# Patient Record
Sex: FEMALE | URBAN - METROPOLITAN AREA
[De-identification: names, ages, dates, MRNs, and addresses within clinical notes are randomized per-mention and may not be internally consistent; named-entity substitution may affect disease eponyms.]

---

## 2022-09-06 ENCOUNTER — HOME CARE VISIT (OUTPATIENT)
Dept: HOSPICE | Facility: HOSPICE | Age: 87
End: 2022-09-06
Payer: MEDICARE

## 2022-09-06 ENCOUNTER — HOSPICE ADMISSION (OUTPATIENT)
Dept: HOSPICE | Facility: HOSPICE | Age: 87
End: 2022-09-06
Payer: MEDICARE

## 2022-09-06 PROCEDURE — 0651 HSPC ROUTINE HOME CARE

## 2022-09-06 PROCEDURE — HOSPICE MEDICATION HC HH HOSPICE MEDICATION

## 2022-09-06 PROCEDURE — 3336500001 HSPC ELECTION

## 2022-09-07 ENCOUNTER — HOME CARE VISIT (OUTPATIENT)
Dept: HOSPICE | Facility: HOSPICE | Age: 87
End: 2022-09-07
Payer: MEDICARE

## 2022-09-07 VITALS
DIASTOLIC BLOOD PRESSURE: 59 MMHG | TEMPERATURE: 97.5 F | SYSTOLIC BLOOD PRESSURE: 109 MMHG | HEART RATE: 88 BPM | RESPIRATION RATE: 20 BRPM | WEIGHT: 126 LBS | OXYGEN SATURATION: 94 %

## 2022-09-07 PROCEDURE — G0299 HHS/HOSPICE OF RN EA 15 MIN: HCPCS

## 2022-09-07 PROCEDURE — HOSPICE MEDICATION HC HH HOSPICE MEDICATION

## 2022-09-07 PROCEDURE — 0651 HSPC ROUTINE HOME CARE

## 2022-09-07 NOTE — HOSPICE
Hospice Admission Summary  Mrs. Nneka Holman old female, admitted to Hospice services for a terminal diagnosis of Intracranial Mass. Patient has elected hospice services and is no longer seeking aggressive treatment. Co-morbidities related to the terminal diagnosis are HTN, Intracranial hemmorage, falls,     Patient also has a past medical history of  Brainstem tumor, Hypertension ,Leukocytosis, Acute kidney injury, Hypomagnesemia . Mrs. Rose status 6 months - 1 year prior to hospice admission A/Ox3,continent of B&B,  living with grandson,independent adl, ambulatory with cane, cooking meals for her and grandson, taking meds by herself, last wt at home per patient 130lbs, denied pain but occassional in last 6 months extremities jerking, dizziness and headache interminently. Patient was taken to hospital after 2nd fall first hitting her head and second falling over her walker and increased confusion, while admitted to hospital she was found to have Intracranial mass on brain no futher treatment available and patient desired to be made comfortable  The patient/family/caregiver grandson; Giana Sommers is  present and willing and safely able to provide care and administer medications, their availability is daily and as needed. The patient/family/caregiver/facility participated in goal setting, care planning, and are agreeable to the care plan. Admission booklet reviewed with patient/family/caregiver/facility; services provided under hospice benefit, review of rights and responsibilities, disposal of medications, contact information for , Joint Commission, Medicare, O, and outside resources for independence. The patient/family/caregiver/facility educated on IDT and their right to attend meetings. Education provided regarding 24-hour availability of hospice services and on-call number provided.     Attending physician:  Dr Elder Middleton Director:  [place] of Care: routine  Advance Directives:  DNR  :  N/A  Allergies:  NKA  BMI 23.5  PPS 40%  MAC:  25   Height:  5'2\"  Weight:  126 4lb weight loss in 1 month  PPS:  40%  FAST:  N/A  NYHA:  N/A   EF%:  N/A   Tobacco usage:  N/A,   Functional status: A/Ox3 with periods of forgetfulness, [place], mod-max adl care, incontinent of B&B, bedfast but would need max x2 assist to transfer to , pureed /mechanical soft, dysphagia, able to assist with turning and reposition in bed, noted dark spots on back, arms and legs, patient stating she doesn't have any appetite or desire to drink her grandson encourages and reminds her to drink and eat, complaint of nausea and vomting with movement, dizziness and dysphagia since her 2nd fall and   10-25% 1-2x daily and 10-12oz daily, pureed to mechanical soft with difficulty swalowing and choking occassionally per patient and able to feed herself   Complaint of nausea and dysphagia with cough using Pepcid and Zofran daily  Infection:  N/A   Pain:  N/A or occassional Headaches medications include Morphine from comfort package, Tylenol  Bowel Regimen:  Senna daily  Lines, Drains, or Airways  N/A  Wounds present:  None  Symptoms to monitor to maintain comfort:  headaches and nausea using Tylenol and Zofran   Hospice Aide Services Requested:  Aide 1x week  MSW Requested:   Requested:  Volunteer Services Requested:  yes  Bereavement risk/contact:  low risk Trev Kan  Patient/family/caregiver specific end of life goal:  comfort care and decrease nausea   Training and education provided this visit: Education provided regarding 24-hour availability of hospice services, comfort medication and on-call number provided. Plan for next visit:  continue education on hospice and followup on nausea   Care coordination with Medical Director, IDG, and team regarding admission to hospice and all are in agreement with plan of care.

## 2022-09-07 NOTE — HOSPICE
Upon arrival to home, patient is sitting quitely watching tv with grandson, [facility], at side. She is VERY hard of hearing and Lai repeats 90% of what I say to her so that she can hear him. She stated, \"women have much softer voices but I can hear him better. \" Patient is pleasant, alert and oreinted x 4. She laughs at me when I ask her her birthday and who [facility] is. She answers all questions appropriately. When questioned about pain, she stated \"my bottom hurst a little bit but if I get in the bed it will stop hurting. \" Patient and [facility] deny any wounds to sacral area at this time. Patient also denies any issues with shortness of breath at this visit. She is very concerned about what medications she will continue to take and states \"everybody keeps changing them so I don't know who to believe. All I know is I have a large brain tumor that's inoperable and isn't going to get better. But one person tells me I'm getting better. Then one person tells me I'm not and I just don't know who to believe any more. \" I advised patient and [facility] that we would be completing a medication reconciliation with Dr Robert Benedict and to continue to take her medications as directed from the facility today until we complete the reconcilitation. Both verbalized understanding. I assisted the patient up 3 stairs to get in a w/c to go to her bedroom. She is able to stand with maximum assistance but is very unsteady when attempting to ambulate with the walker. [facility] is behind her with his fingers in her beltloop and had he not been, she would've fallen multiple times coming up the steps. Her legs appeared to [place] out on her\" and she is shaking and states \"this is what the tumor is doing to me. \" Once in the bedroom, again with assistance, she is able to get from the w/c to her own bed which she is starting to realize is a little high up for her.  I talked to [facility] and patient about getting them a hospital bed and they both verbalized that they \"want to keep this bed for now. \" It is a motorized bed that will reposition her legs and back but does not lower or lift higher. Laure asked about the bedside table and I told him that we would follow up to find out about delivery. The transport w/c was delivered today but not the BST. I did explain to Laure that we have been having some items that are on back order but the order stays active and they will deliver one as soon as possible. He verbalized understanding. Laure asked about a plan for Wednesday. I explained to [facility] that the  would be coming out to complete the patient's assessment/admission and that someone would call him in the morning to schedule a time to come see her. He verbalized understanding of this and had no additional questions/concerns at this time. They were both grateful for the tuck in visit tonight. Patient was resting in bed in no acute distress upon the completion of my visit. Advised Lai to call the 24 hour number if needed and he agreed to do so.

## 2022-09-08 ENCOUNTER — HOME CARE VISIT (OUTPATIENT)
Dept: HOSPICE | Facility: HOSPICE | Age: 87
End: 2022-09-08
Payer: MEDICARE

## 2022-09-08 ENCOUNTER — HOME CARE VISIT (OUTPATIENT)
Dept: SCHEDULING | Facility: HOME HEALTH | Age: 87
End: 2022-09-08
Payer: MEDICARE

## 2022-09-08 PROCEDURE — 0651 HSPC ROUTINE HOME CARE

## 2022-09-08 PROCEDURE — HOSPICE MEDICATION HC HH HOSPICE MEDICATION

## 2022-09-08 PROCEDURE — G0155 HHCP-SVS OF CSW,EA 15 MIN: HCPCS

## 2022-09-09 ENCOUNTER — HOME CARE VISIT (OUTPATIENT)
Dept: HOSPICE | Facility: HOSPICE | Age: 87
End: 2022-09-09
Payer: MEDICARE

## 2022-09-09 ENCOUNTER — HOME CARE VISIT (OUTPATIENT)
Dept: SCHEDULING | Facility: HOME HEALTH | Age: 87
End: 2022-09-09
Payer: MEDICARE

## 2022-09-09 PROCEDURE — 0651 HSPC ROUTINE HOME CARE

## 2022-09-09 PROCEDURE — G0156 HHCP-SVS OF AIDE,EA 15 MIN: HCPCS

## 2022-09-09 PROCEDURE — G0300 HHS/HOSPICE OF LPN EA 15 MIN: HCPCS

## 2022-09-09 NOTE — HOSPICE
The following standard precautions for infection control were utilized:  face mask, hand , and eye protection. The purpose of today's visit was to complete an initial psychosocial assessment for [facility]. [facility] is a 80 y.o. female admitted to hospice on 09/06/22 with a terminal diagnosis of Intracranial mass. SW was greeted at the front door of the home by kesha Banks and primary CG of pt. The home is well cared for and clutter free. There are no visible signs of any safety concerns noted by SW at this time. The home is occupied by the PT and CG. CG states the residence is his grandmother's but he has lived in the home with her for most of his life. At the time of SW visit, PT had just had a light lunch and was asleep. All information during assessment was gained through primary CG. CG states that about a year prior to admission PT had been experiencing dizziness and several falls. CG took PT to physician who related falls and forgetfulness to age. Recently the pt experienced a fall that caused an admission to the ED, when tests showed a tumor at the base of the brain. CG stated that PT was sent to [facility] for OT and PT but was unable to make progress and it was decided that hospice services would be beneficial at this time. CG brought pt home and pt was admitted to hospice services. CG stated the first time he ever had to change the diaper of an adult was last week, but he feels he is comfortable being able to provide the care needed for his grandmother. SW speaks with CG about spending time in the room when LOMELI arrives to receive education on pt positioning, moving pt, incontinence care and other education that can be provided that will be useful to both pt and CG. CG states that PT lived a long full and active life. CG states the \"fall\" changed her.   PT had owned two homes in [address] and up until about 10 years ago spent time between each home.  PT was driving, cooking, and completing all ADL's prior to last ED admission. CG states that PT believed she had  arrangement preplanned but they realized about 1 year ago that it was just for burial.  At that time, pt appointed 10k for CG to use for  services. A  home has not been chosen at this time. SW gives CG several local  homes and encourages to call and find one he feels can meet his needs when pt passes. He agrees to begin the process. CG expressed that he and pt have gone over all financial matters and he is listed on her bank account (upon death), is executor of the will and he has been given instruction on pt wishes for dispersement of her assets. A financial POA was signed and notorized in the hospital, but the bank would not accept the document. SW will provided CG with the correct legal documentation and provide notarization services next week. CG states pt does not require pain medication at this time, but he welcomed education on EOL pain medication and reason for its use. CG stated that a private CG will being in the home in about 2 weeks, so he can return to work. She will be providing care full time Wednesday-, weekly. CG reports that pt still is sharp in her mind and only occasionally confused (mostly with the different cg's that are now coming from hospice). CG received supplies while SW was visiting. He did request X large gloves instead of large. RN ELISA will be notified of request. John Hoff is a low on the bereavement risk assessment. He feels that his grandmother and he have talked openly about EOL issues. There are no other needs at this time. CG is notified of his right to attend IDG meetings. CG is in agreement with POC.

## 2022-09-10 VITALS
RESPIRATION RATE: 18 BRPM | DIASTOLIC BLOOD PRESSURE: 78 MMHG | SYSTOLIC BLOOD PRESSURE: 132 MMHG | HEART RATE: 88 BPM | TEMPERATURE: 97.7 F

## 2022-09-10 PROCEDURE — 0651 HSPC ROUTINE HOME CARE

## 2022-09-10 NOTE — HOSPICE
Patient presents sitting up in bed. No signs of distress or discomfort. Patient states she hasn't had a BM since Sunday at the rehab facility. Abdomen is soft and non tender, no distension noted. Caregiver states there has been no change in appetite. Suppository given this visit. Pain:Patient denies pain    Medication refills: None needed    Medications reconciled and all medications are available in the home this visit. The following education was provided regarding medications, medication interactions, and look a like medications: Medication side effects, dosages, purposes, frequencies. Response to teaching: Verbalized understanding. Medications  are effective at this time. Supplies by type and quantity ordered/delivered this visit include: None needed    Consulted attending physician regarding: Not needed this visit    Instructed patient and family on 24-hour hospice availability and phone number.

## 2022-09-10 NOTE — HOSPICE
The following standard precautions for infection control were utilized: Face Mask, Safety Glasses and Gloves. Care provided per established plan of care:  Yes. Patient is without complaints during care provided. Patient requests: N/A    Family/Caregiver requests:  N/A    Communicated to , Clinical Manager, or Director regarding patient/family/caregiver/aide findings:  N/A    Status of patient upon end of hospice aide visit:  Patient laying in her hospital bed watching TV.

## 2022-09-11 PROCEDURE — 0651 HSPC ROUTINE HOME CARE

## 2022-09-12 ENCOUNTER — HOME CARE VISIT (OUTPATIENT)
Dept: SCHEDULING | Facility: HOME HEALTH | Age: 87
End: 2022-09-12
Payer: MEDICARE

## 2022-09-12 PROCEDURE — 0651 HSPC ROUTINE HOME CARE

## 2022-09-12 PROCEDURE — G0299 HHS/HOSPICE OF RN EA 15 MIN: HCPCS

## 2022-09-12 NOTE — HOSPICE
The following standard precautions for infection control were utilized:  Mask  Patient was in bed and her daughter from [place] was brushing her hair. She was in great spirits and we talk for sometime. Family has agreed to one visit per month for three months.

## 2022-09-13 VITALS
HEART RATE: 98 BPM | TEMPERATURE: 96.5 F | RESPIRATION RATE: 18 BRPM | DIASTOLIC BLOOD PRESSURE: 73 MMHG | OXYGEN SATURATION: 94 % | SYSTOLIC BLOOD PRESSURE: 106 MMHG

## 2022-09-13 PROCEDURE — 0651 HSPC ROUTINE HOME CARE

## 2022-09-13 NOTE — HOSPICE
Patient a/ox3 able to communicate needs and assist with turning sitting on the side of bed upon my arrival stating she has dizziness with movement for brief periods   patient eating 1-2 meals 25% and stating she has no appetite only eats for nutrition still having nausea but using zofran 2x daily and effective per patient  grandson and daughter present during visit, nurse demonstrated to daughter how to change patient brief and she verbalized understanding      Medication refills ordered this visit: Dulcolax suppository     Medications reconciled and all medications are/are not available in the home this visit. The following education was provided regarding medications, medication interactions, and look alike medications. Response to teaching: verbalized understanding of need to give suppository if no BM in 3 days and use of stool softner daily and agreed by patient and grandson. Medications are effective at this time. Supplies by type and quantity ordered this visit include: brief lg and chux    Consulted medical director/attending physician regarding: no need    Instructed patient/family/caregiver on 24-hour hospice availability and phone number.     Plan for next visit:  monitor for n/v and dizziness

## 2022-09-14 PROCEDURE — 0651 HSPC ROUTINE HOME CARE

## 2022-09-15 ENCOUNTER — HOME CARE VISIT (OUTPATIENT)
Dept: SCHEDULING | Facility: HOME HEALTH | Age: 87
End: 2022-09-15
Payer: MEDICARE

## 2022-09-15 ENCOUNTER — HOME CARE VISIT (OUTPATIENT)
Dept: HOSPICE | Facility: HOSPICE | Age: 87
End: 2022-09-15
Payer: MEDICARE

## 2022-09-15 VITALS
TEMPERATURE: 96.5 F | SYSTOLIC BLOOD PRESSURE: 88 MMHG | DIASTOLIC BLOOD PRESSURE: 54 MMHG | HEART RATE: 94 BPM | RESPIRATION RATE: 18 BRPM | OXYGEN SATURATION: 95 %

## 2022-09-15 PROCEDURE — HOSPICE MEDICATION HC HH HOSPICE MEDICATION

## 2022-09-15 PROCEDURE — 0651 HSPC ROUTINE HOME CARE

## 2022-09-15 PROCEDURE — G0156 HHCP-SVS OF AIDE,EA 15 MIN: HCPCS

## 2022-09-15 PROCEDURE — G0155 HHCP-SVS OF CSW,EA 15 MIN: HCPCS

## 2022-09-15 PROCEDURE — G0299 HHS/HOSPICE OF RN EA 15 MIN: HCPCS

## 2022-09-15 NOTE — HOSPICE
patient A/Ox3 denied pain but stated she has headcahe 4/10 in the mornings but goes away after a while  Zofran is being taken 2x daily and pepsid 2x also   noted swollen tongue and white complaint of thickness and difficulty swallowing   NP called to report this change she ordered Nystatin and sent to pharm   patient and grandson made aware and verbalized understanding      Medication refills ordered this visit: zofran     Medications reconciled and all medications are/are not available in the home this visit. The following education was provided regarding medications, medication interactions, and look alike medications. Response to teaching: verbalized understanding of use of Nystatin. Medications are not effective at this time related to not in home at this time. Supplies by type and quantity ordered this visit include: none      Consulted medical director/attending physician regarding: reported thick and white tongue    Instructed patient/family/caregiver on 24-hour hospice availability and phone number.     Plan for next visit:  follow up on tongue and osmany coccyx

## 2022-09-16 ENCOUNTER — HOME CARE VISIT (OUTPATIENT)
Dept: HOSPICE | Facility: HOSPICE | Age: 87
End: 2022-09-16
Payer: MEDICARE

## 2022-09-16 PROCEDURE — 0651 HSPC ROUTINE HOME CARE

## 2022-09-16 NOTE — HOSPICE
The following standard precautions for infection control were utilized: Face Mask, Safety Glasses and Gloves. Care provided per established plan of care:  Yes. Patient is without complaints during care provided. Patient requests: N/A    Family/Caregiver requests: N/A    Communicated to , Clinical Manager, or Director regarding patient/family/caregiver/aide findings: N/A    Status of patient upon end of hospice aide visit:  Patient sitting up in her bed talking to her daughter who is visiting from out of town. Daughter is at patient bedside.

## 2022-09-17 PROCEDURE — 0651 HSPC ROUTINE HOME CARE

## 2022-09-18 PROCEDURE — 0651 HSPC ROUTINE HOME CARE

## 2022-09-19 ENCOUNTER — HOME CARE VISIT (OUTPATIENT)
Dept: SCHEDULING | Facility: HOME HEALTH | Age: 87
End: 2022-09-19
Payer: MEDICARE

## 2022-09-19 ENCOUNTER — HOME CARE VISIT (OUTPATIENT)
Dept: HOSPICE | Facility: HOSPICE | Age: 87
End: 2022-09-19
Payer: MEDICARE

## 2022-09-19 PROCEDURE — G0299 HHS/HOSPICE OF RN EA 15 MIN: HCPCS

## 2022-09-19 PROCEDURE — HOSPICE MEDICATION HC HH HOSPICE MEDICATION

## 2022-09-19 PROCEDURE — 0651 HSPC ROUTINE HOME CARE

## 2022-09-19 NOTE — HOSPICE
The following standard precautions for infection control were utilized:  face mask, hand , eye protection. Today's visit was a PRN visit to help discuss financial needs of patient. The SW was greeted at the door by pt's daughter that is visiting from [place]. CG had stated that there was a need for need POA paperwork to be completed so that he Gabino Bradford, primary CG) could have access to take care of the financial needs of pt. The patient had previously given POA to Elaina Tyrese but the bank would not except the document (unclear why). SW presented family with need POA paperwork for [employer]. SW went over the document and will return when family has time to discuss the documents with pt. Family asked not to sign the paperwork until SW could return and witness signatures and notarize the document. CG understands. SW also offered emotional support and supportive listening to the daughter of pt, who has some unresolved emotional turmoil with the pt. Active listening and feelings were explored. Follow up will be needed for paperwork. A bereavement POC will be added for daughter of patient.

## 2022-09-20 ENCOUNTER — HOME CARE VISIT (OUTPATIENT)
Dept: HOSPICE | Facility: HOSPICE | Age: 87
End: 2022-09-20
Payer: MEDICARE

## 2022-09-20 VITALS
RESPIRATION RATE: 18 BRPM | OXYGEN SATURATION: 90 % | HEART RATE: 95 BPM | SYSTOLIC BLOOD PRESSURE: 119 MMHG | DIASTOLIC BLOOD PRESSURE: 72 MMHG | TEMPERATURE: 96.5 F

## 2022-09-20 PROCEDURE — 0651 HSPC ROUTINE HOME CARE

## 2022-09-21 PROCEDURE — 0651 HSPC ROUTINE HOME CARE

## 2022-09-22 ENCOUNTER — HOME CARE VISIT (OUTPATIENT)
Dept: SCHEDULING | Facility: HOME HEALTH | Age: 87
End: 2022-09-22
Payer: MEDICARE

## 2022-09-22 VITALS
SYSTOLIC BLOOD PRESSURE: 127 MMHG | HEART RATE: 76 BPM | DIASTOLIC BLOOD PRESSURE: 80 MMHG | OXYGEN SATURATION: 93 % | TEMPERATURE: 97.2 F | RESPIRATION RATE: 20 BRPM

## 2022-09-22 PROCEDURE — G0299 HHS/HOSPICE OF RN EA 15 MIN: HCPCS

## 2022-09-22 PROCEDURE — 0651 HSPC ROUTINE HOME CARE

## 2022-09-22 PROCEDURE — G0156 HHCP-SVS OF AIDE,EA 15 MIN: HCPCS

## 2022-09-22 NOTE — HOSPICE
Patient A/Ox3 denied pain stated she had a bad night her head felt full, but not now  hoda present during visit, voiced supplies need and patient had bm 2 days ago   patient stated she feels a little winded today after being repositioned   caregiver has been holding bp med because bp has been low 102/76 for 2 days and today med wasn't taken band the bp is good so we discussed holding bp med unless over 140/100 and both agreed but he will check daily  Medication refills ordered this visit: none    Medications reconciled and all medications are/are not available in the home this visit. The following education was provided regarding medications, medication interactions, and look alike medications. Response to teaching: caregiver verbalized understanding of the monitoring blood pressure med related to dizziness possibly holding because of decreased bp. Medications are effective at this time and only being taken if bp is too above 140/100. Supplies by type and quantity ordered this visit include: chux, med brief     Consulted medical director/attending physician regarding: reported dizziness and holding bp med per caregiver request    Instructed patient/family/caregiver on 24-hour hospice availability and phone number.     Plan for next visit:  monitor bp and dizziness

## 2022-09-23 PROCEDURE — 0651 HSPC ROUTINE HOME CARE

## 2022-09-23 NOTE — HOSPICE
The following standard precautions for infection control were utilized: Face Mask, Safety Glasses and Gloves. Care provided per established plan of care:  Yes. Patient is without complaints during care provided. Patient requests:  N/A    Family/Caregiver requests:  N/A    Communicated to , Clinical Manager, or Director regarding patient/family/caregiver/aide findings: N/A     Status of patient upon end of hospice aide visit:  Patient laying in her bed with her eyes opened and her grandson by her bedside.

## 2022-09-24 PROCEDURE — 0651 HSPC ROUTINE HOME CARE

## 2022-09-25 ENCOUNTER — HOME CARE VISIT (OUTPATIENT)
Dept: HOSPICE | Facility: HOSPICE | Age: 87
End: 2022-09-25
Payer: MEDICARE

## 2022-09-25 PROCEDURE — 0651 HSPC ROUTINE HOME CARE

## 2022-09-26 ENCOUNTER — HOME CARE VISIT (OUTPATIENT)
Dept: SCHEDULING | Facility: HOME HEALTH | Age: 87
End: 2022-09-26
Payer: MEDICARE

## 2022-09-26 VITALS
HEART RATE: 94 BPM | TEMPERATURE: 96.4 F | RESPIRATION RATE: 18 BRPM | SYSTOLIC BLOOD PRESSURE: 116 MMHG | DIASTOLIC BLOOD PRESSURE: 58 MMHG

## 2022-09-26 PROCEDURE — G0156 HHCP-SVS OF AIDE,EA 15 MIN: HCPCS

## 2022-09-26 PROCEDURE — G0299 HHS/HOSPICE OF RN EA 15 MIN: HCPCS

## 2022-09-26 PROCEDURE — 0651 HSPC ROUTINE HOME CARE

## 2022-09-26 NOTE — HOSPICE
paatient a/ox3 and complaint of coccyx intact and osmany but painful when alseep mostly, headache daily and using Ibuprophren used occasionally   appetite 1-2 meals and drinking ensure daily   complaint of occasional a little burning when first start but goes away   decreased in n/v and continues to use Zofran 2x daily and increased headache pain when lying flat in bed  complaint of insomnia and md increased to 2 tabs     Medication refills ordered this visit: none    Medications reconciled and all medications are/are not available in the home this visit. The following education was provided regarding medications, medication interactions, and look alike medications. Response to teaching: verbalized understanding. Medications are effective at this time. Supplies by type and quantity ordered this visit include: none marce    Consulted medical director/attending physician regarding: no need    Instructed patient/family/caregiver on 24-hour hospice availability and phone number.     Plan for next visit:  monitor headaches, coccyx for pain and opening

## 2022-09-27 ENCOUNTER — HOME CARE VISIT (OUTPATIENT)
Dept: HOSPICE | Facility: HOSPICE | Age: 87
End: 2022-09-27
Payer: MEDICARE

## 2022-09-27 PROCEDURE — 0651 HSPC ROUTINE HOME CARE

## 2022-09-27 PROCEDURE — G0155 HHCP-SVS OF CSW,EA 15 MIN: HCPCS

## 2022-09-28 ENCOUNTER — HOME CARE VISIT (OUTPATIENT)
Dept: HOSPICE | Facility: HOSPICE | Age: 87
End: 2022-09-28
Payer: MEDICARE

## 2022-09-28 ENCOUNTER — HOME CARE VISIT (OUTPATIENT)
Dept: SCHEDULING | Facility: HOME HEALTH | Age: 87
End: 2022-09-28
Payer: MEDICARE

## 2022-09-28 PROCEDURE — 0651 HSPC ROUTINE HOME CARE

## 2022-09-28 NOTE — HOSPICE
The following standard precautions for infection control were utilized:  Mask  Patient was watching TV with her caregiver. Today she shared more about her life and her two daughters. Patient also states that she is taking it one day at a time and that this is all in Gods hands.  will follow up with family, patient and .

## 2022-09-28 NOTE — HOSPICE
The following standard precautions for infection control were utilized:  face mask, eye protection, and hand . Today's PRN visit was to assist cg/pt with [facility] services for transfer of the title of the home. SW was welcomed into the home by kesha Jones. Patient was received in her room laying in bed resting. The home well cared for, clutter free and no visible safety concerns noted. PT welcomed SW into the room. SW has to speak loudly due to pt's hearing. PT stated she is feeling a little tired but was ready to get the paperwork signed for her grandson. PT went over reasons with SW about why Nedra Shane was getting the home. SW provided active listening as she talked about her daughter moving to [place] and grandson Nedra Shane returning to pt's home once he turned 25. There is some tension still present when pt speaks about daughter moving away. PT and SW discussed forgiveness and acceptance. SW and CG discussed the paperwork that she was signing and she agreed that Nedra Shane would be titled the home and she understood she is able to change her mind at any time. PT and SW also discussed her  arrangements and possible cost involved. PT stated she has a burial plot but has not paid for a coffin or service yet. PT questioned if the money she had set aside would be enough and SW agreed that what was set aside for  services would be more than enough. Pt has a goal of making it to her 89st birthday just a couple weeks away. Pt has a very sharp mind and can recall very quickly about her finances, health, and family. SW, CG and pt go over both the transfer of deed to the home and pt also decides to sign a durable POA at this time, to give Nedra Shane access to her banking account, should she become unable to make those decisions. No other needs discussed at this time.

## 2022-09-29 ENCOUNTER — HOME CARE VISIT (OUTPATIENT)
Dept: SCHEDULING | Facility: HOME HEALTH | Age: 87
End: 2022-09-29
Payer: MEDICARE

## 2022-09-29 PROCEDURE — G0156 HHCP-SVS OF AIDE,EA 15 MIN: HCPCS

## 2022-09-29 PROCEDURE — 0651 HSPC ROUTINE HOME CARE

## 2022-09-29 PROCEDURE — G0299 HHS/HOSPICE OF RN EA 15 MIN: HCPCS

## 2022-09-29 NOTE — HOSPICE
The following standard precautions for infection control were utilized: Face Mask, Safety Glasses and Gloves. Care provided per established plan of care:  Yes. Patient is without complaints during care provided. Patient requests: N/A    Family/Caregiver requests:  N/A    Communicated to , Clinical Manager, or Director regarding patient/family/caregiver/aide findings:  N/A    Status of patient upon end of hospice aide visit:  Patient laying in her bed watching TV.

## 2022-09-30 VITALS — TEMPERATURE: 97.2 F | SYSTOLIC BLOOD PRESSURE: 118 MMHG | OXYGEN SATURATION: 94 % | DIASTOLIC BLOOD PRESSURE: 78 MMHG

## 2022-09-30 PROCEDURE — 0651 HSPC ROUTINE HOME CARE

## 2022-09-30 NOTE — HOSPICE
patient bed denied pain or dizziness no nausea  in past week  discussion with patient and grandson about lowered bp, decreased dizziness and no need for blood pressure meds  he agreed to continue to stop med   Np updated and dc'd med and new nurse will monitor bp if need for meds related to dizziness after med taken with sudden movement   decreased episodes of dizziness since Norvasc has been held and bp wnl     Medication refills ordered this visit: none    Medications reconciled and all medications are/are not available in the home this visit. The following education was provided regarding medications, medication interactions, and look alike medications. Response to teaching: verbalized understanding to call hospice if bp above 160/100. stopping the bp medication are effective at this time. Supplies by type and quantity ordered this visit include: none    Consulted medical director/attending physician regarding: reported the continued stop of Lisinopril related to low bp and dizziness Np Kurtis Chambers agreed to dc and monitor bp weekly    Instructed patient/family/caregiver on 24-hour hospice availability and phone number.     Plan for next visit:  monitor bp weekly

## 2022-10-01 PROCEDURE — 0651 HSPC ROUTINE HOME CARE

## 2022-10-01 NOTE — HOSPICE
The following standard precautions for infection control were utilized: Face Mask, Safety Glasses and Gloves. Care provided per established plan of care: Yes. Patient is without complaints during care provided. Patient requests: N/A    Family/Caregiver requests:  N/A    Communicated to , Clinical Manager, or Director regarding patient/family/caregiver/aide findings:   was present for part of this visit. Status of patient upon end of hospice aide visit:  Patient sitting up in her hospital bed talking to her paid aide about what she wants for lunch.

## 2022-10-02 PROCEDURE — 0651 HSPC ROUTINE HOME CARE

## 2022-10-03 ENCOUNTER — HOME CARE VISIT (OUTPATIENT)
Dept: SCHEDULING | Facility: HOME HEALTH | Age: 87
End: 2022-10-03
Payer: MEDICARE

## 2022-10-03 VITALS
TEMPERATURE: 97.9 F | DIASTOLIC BLOOD PRESSURE: 84 MMHG | HEART RATE: 98 BPM | RESPIRATION RATE: 18 BRPM | OXYGEN SATURATION: 92 % | SYSTOLIC BLOOD PRESSURE: 127 MMHG

## 2022-10-03 PROCEDURE — G0156 HHCP-SVS OF AIDE,EA 15 MIN: HCPCS

## 2022-10-03 PROCEDURE — G0300 HHS/HOSPICE OF LPN EA 15 MIN: HCPCS

## 2022-10-03 PROCEDURE — 0651 HSPC ROUTINE HOME CARE

## 2022-10-03 PROCEDURE — HOSPICE MEDICATION HC HH HOSPICE MEDICATION

## 2022-10-03 NOTE — HOSPICE
Patient presents lying in bed in supine position. NO signs of discomfort. Denies pain. No new concerns per kesha Sheikh. Medication refills: Trazadone and Zofran   [id number] confirmation number    Medications reconciled and all medications are available in the home this visit. The following education was provided regarding medications, medication interactions, and look a like medications: Medication side effects, dosages, purposes, frequencies. Response to teaching: Verbalized understanding. Medications  are effective at this time. Supplies by type and quantity ordered/delivered this visit include: None needed    Consulted attending physician regarding: Not needed this visit    Instructed patient and family on 24-hour hospice availability and phone number.

## 2022-10-04 PROCEDURE — 0651 HSPC ROUTINE HOME CARE

## 2022-10-05 PROCEDURE — 0651 HSPC ROUTINE HOME CARE

## 2022-10-06 ENCOUNTER — HOME CARE VISIT (OUTPATIENT)
Dept: SCHEDULING | Facility: HOME HEALTH | Age: 87
End: 2022-10-06
Payer: MEDICARE

## 2022-10-06 VITALS
RESPIRATION RATE: 18 BRPM | TEMPERATURE: 98.5 F | HEART RATE: 84 BPM | OXYGEN SATURATION: 92 % | DIASTOLIC BLOOD PRESSURE: 87 MMHG | SYSTOLIC BLOOD PRESSURE: 129 MMHG

## 2022-10-06 PROCEDURE — G0300 HHS/HOSPICE OF LPN EA 15 MIN: HCPCS

## 2022-10-06 PROCEDURE — 0651 HSPC ROUTINE HOME CARE

## 2022-10-06 PROCEDURE — G0156 HHCP-SVS OF AIDE,EA 15 MIN: HCPCS

## 2022-10-07 PROCEDURE — 0651 HSPC ROUTINE HOME CARE

## 2022-10-07 NOTE — HOSPICE
Patient presents in bed on her right side. Complained of a headache. Repositioned and felt better. No new concerns per caregiver at this time. Medication refills: None needed    Medications reconciled and all medications are available in the home this visit. The following education was provided regarding medications, medication interactions, and look a like medications: Medication side effects, dosages, purposes, frequencies. Response to teaching: Verbalized understanding. Medications  are effective at this time. Supplies by type and quantity ordered/delivered this visit include: None needed at this time    [facility] attending physician regarding: Not needed this visit. Instructed patient and family on 24-hour hospice availability and phone number.

## 2022-10-07 NOTE — HOSPICE
The following standard precautions for infection control were utilized: Face Mask, Safety Glasses and Gloves. Care provided per established plan of care:  Yes. Patient is without complaints during care provided. Patient requests: N/A    Family/Caregiver requests:  N/A    Communicated to , Clinical Manager, or Director regarding patient/family/caregiver/aide findings:  N/A    Status of patient upon end of hospice aide visit:  Patient sitting up in her bed watching TV.

## 2022-10-08 PROCEDURE — 0651 HSPC ROUTINE HOME CARE

## 2022-10-09 PROCEDURE — 0651 HSPC ROUTINE HOME CARE

## 2022-10-10 ENCOUNTER — HOME CARE VISIT (OUTPATIENT)
Dept: SCHEDULING | Facility: HOME HEALTH | Age: 87
End: 2022-10-10
Payer: MEDICARE

## 2022-10-10 VITALS
RESPIRATION RATE: 17 BRPM | HEART RATE: 87 BPM | OXYGEN SATURATION: 94 % | SYSTOLIC BLOOD PRESSURE: 119 MMHG | DIASTOLIC BLOOD PRESSURE: 66 MMHG | TEMPERATURE: 98 F

## 2022-10-10 PROCEDURE — G0299 HHS/HOSPICE OF RN EA 15 MIN: HCPCS

## 2022-10-10 PROCEDURE — 0651 HSPC ROUTINE HOME CARE

## 2022-10-10 PROCEDURE — G0156 HHCP-SVS OF AIDE,EA 15 MIN: HCPCS

## 2022-10-10 NOTE — HOSPICE
Medication refills ordered this visit: dulcolax suppositories    Medications reconciled and all medications are available in the home this visit. Education was provided regarding medications, medication interactions, and look alike medications. Response to teaching: verbalized understanding. Medications are effective at this time. Supplies by type and quantity ordered this visit include: barrier cream, skin prep, wipes, calmoseptine, medium briefs, 4x4 foam    Consulted medical director/attending physician regarding: none    Instructed patient/family/caregiver on 24-hour hospice availability and phone number.

## 2022-10-11 PROCEDURE — 0651 HSPC ROUTINE HOME CARE

## 2022-10-11 NOTE — HOSPICE
The following standard precautions for infection control were utilized: Face Mask, Safety Glasses and Gloves. Care provided per established plan of care:  Yes. Patient is without complaints during care provided. Patient requests: N/A    Family/Caregiver requests:  N/A    Communicated to , Clinical Manager, or Director regarding patient/family/caregiver/aide findings:  N/A    Status of patient upon end of hospice aide visit:  Patient sitting up in her hospital bed talking to her grandson Robert Urena who is at her bedside.

## 2022-10-12 PROCEDURE — 0651 HSPC ROUTINE HOME CARE

## 2022-10-13 ENCOUNTER — HOME CARE VISIT (OUTPATIENT)
Dept: SCHEDULING | Facility: HOME HEALTH | Age: 87
End: 2022-10-13
Payer: MEDICARE

## 2022-10-13 PROCEDURE — G0299 HHS/HOSPICE OF RN EA 15 MIN: HCPCS

## 2022-10-13 PROCEDURE — HOSPICE MEDICATION HC HH HOSPICE MEDICATION

## 2022-10-13 PROCEDURE — G0156 HHCP-SVS OF AIDE,EA 15 MIN: HCPCS

## 2022-10-13 PROCEDURE — 0651 HSPC ROUTINE HOME CARE

## 2022-10-14 PROCEDURE — 0651 HSPC ROUTINE HOME CARE

## 2022-10-14 NOTE — HOSPICE
The following standard precautions for infection control were utilized: Face Mask, Safety Glasses and Gloves. Care provided per established plan of care:  Yes. Patient is without complaints during care provided. Patient requests:N/A    Family/Caregiver requests:  N/A    Communicated to , Clinical Manager, or Director regarding patient/family/caregiver/aide findings:  Communicated with Peter Ivey RN Case Manager. Status of patient upon end of hospice aide visit:  Patient laying in her bed talking with paid aide. Patient said that she was going to sleep.

## 2022-10-14 NOTE — HOSPICE
Medication refills ordered this visit: pepcid    Medications reconciled and all medications are available in the home this visit. Education was provided regarding medications, medication interactions, and look alike medications. Response to teaching: verbalized understanding. Medications are effective at this time. Supplies by type and quantity ordered this visit include: none    Consulted medical director/attending physician regarding: none    Instructed patient/family/caregiver on 24-hour hospice availability and phone number.

## 2022-10-15 PROCEDURE — 0651 HSPC ROUTINE HOME CARE

## 2022-10-16 ENCOUNTER — HOME CARE VISIT (OUTPATIENT)
Dept: HOSPICE | Facility: HOSPICE | Age: 87
End: 2022-10-16
Payer: MEDICARE

## 2022-10-16 PROCEDURE — 0651 HSPC ROUTINE HOME CARE

## 2022-10-17 ENCOUNTER — HOME CARE VISIT (OUTPATIENT)
Dept: SCHEDULING | Facility: HOME HEALTH | Age: 87
End: 2022-10-17
Payer: MEDICARE

## 2022-10-17 VITALS
OXYGEN SATURATION: 94 % | TEMPERATURE: 98.2 F | DIASTOLIC BLOOD PRESSURE: 81 MMHG | HEART RATE: 83 BPM | SYSTOLIC BLOOD PRESSURE: 122 MMHG | RESPIRATION RATE: 18 BRPM

## 2022-10-17 PROCEDURE — G0299 HHS/HOSPICE OF RN EA 15 MIN: HCPCS

## 2022-10-17 PROCEDURE — G0156 HHCP-SVS OF AIDE,EA 15 MIN: HCPCS

## 2022-10-17 PROCEDURE — 0651 HSPC ROUTINE HOME CARE

## 2022-10-17 PROCEDURE — HOSPICE MEDICATION HC HH HOSPICE MEDICATION

## 2022-10-17 NOTE — HOSPICE
Medication refills ordered this visit: zofran, dexamethasone    Medications reconciled and all medications are available in the home this visit. Education was provided regarding medications, medication interactions, and look alike medications. Response to teaching: verbalized understanding. Medications are effective at this time. Supplies by type and quantity ordered this visit include: marce    Consulted medical director/attending physician regarding: headache to back of the head and right side - orders for dexamethasone entered into [facility]    Instructed patient/family/caregiver on 24-hour hospice availability and phone number.
None

## 2022-10-18 PROCEDURE — HOSPICE MEDICATION HC HH HOSPICE MEDICATION

## 2022-10-18 PROCEDURE — 0651 HSPC ROUTINE HOME CARE

## 2022-10-19 ENCOUNTER — HOME CARE VISIT (OUTPATIENT)
Dept: SCHEDULING | Facility: HOME HEALTH | Age: 87
End: 2022-10-19
Payer: MEDICARE

## 2022-10-19 PROCEDURE — G0155 HHCP-SVS OF CSW,EA 15 MIN: HCPCS

## 2022-10-19 PROCEDURE — 0651 HSPC ROUTINE HOME CARE

## 2022-10-20 ENCOUNTER — HOME CARE VISIT (OUTPATIENT)
Dept: SCHEDULING | Facility: HOME HEALTH | Age: 87
End: 2022-10-20
Payer: MEDICARE

## 2022-10-20 PROCEDURE — G0156 HHCP-SVS OF AIDE,EA 15 MIN: HCPCS

## 2022-10-20 PROCEDURE — 0651 HSPC ROUTINE HOME CARE

## 2022-10-20 PROCEDURE — G0299 HHS/HOSPICE OF RN EA 15 MIN: HCPCS

## 2022-10-20 NOTE — HOSPICE
The purpose of today's visit was to complete a monthly SW visit. SW was met at the door by CG (Ny rebolledo). Today is the pt's 80st Birthday and several people were in the homes living area and patient was enjoying cake and fellowship with friends/family. The home is in good and orderly condition and there are no visible signs of safety concerns. SW presented patient with a bouquet of flowers, cupcakes and a birthday card. SW asked pt if she remember  promise of birthday [place] and she stated, \"That seems like a long time ago. \"  SW stated and we both kept our promise! Patient has been in the bed for a majority of the time she has been on hospice, but today she was in a wheelchair and alert. Pt began to speak with SW about how she got on hospice by falling and hitting her head. Pt spoke about the events for awhile and SW gently guided her into a happier conversation about her birthday. PT began to do life review about how many things she's seen change over her 89 years. Pt reports that she is feeling well today and her head is not bothering her as much as the last two days. CG states that he feels the new medication for her head pain appears to be working. Pt was in a very good and talkative mood. SW stated it was good to see her so happy. CG and pt thanked SW for bringing the birthday well wishes from the hospice staff. CG reported that he is no longer working due to paying the private CG most of his income. He stated that he felt it was better to be the CG and his boss will rehire him after his grandmother passes. SW asked if he had any needs for [facility] paperwork and he denied the need. No other needs discussed at this time.

## 2022-10-21 PROCEDURE — 0651 HSPC ROUTINE HOME CARE

## 2022-10-21 NOTE — HOSPICE
Medication refills ordered this visit: none    Medications reconciled and all medications are available in the home this visit. Education was provided regarding medications, medication interactions, and look alike medications. Response to teaching: verbalized understanding. Medications are effective at this time. Supplies by type and quantity ordered this visit include: none    Consulted medical director/attending physician regarding: none    Instructed patient/family/caregiver on 24-hour hospice availability and phone number.

## 2022-10-22 PROCEDURE — 0651 HSPC ROUTINE HOME CARE

## 2022-10-23 PROCEDURE — 0651 HSPC ROUTINE HOME CARE

## 2022-10-23 NOTE — HOSPICE
The following standard precautions for infection control were utilized: Face Mask, Safety Glasses and Gloves. Care provided per established plan of care:  Yes. Patient is without complaints during care provided. Patient requests: N/A    Family/Caregiver requests:  N/A    Communicated to , Clinical Manager, or Director regarding patient/family/caregiver/aide findings:  N/A    Status of patient upon end of hospice aide visit:  Patient sitting up in her hospital bed with her grandson by her bedside.

## 2022-10-23 NOTE — HOSPICE
The following standard precautions for infection control were utilized: Face Mask, Safety Glasses and Gloves. Care provided per established plan of care:  Yes. Patient is without complaints during care provided. Patient requests:N/A    Family/Caregiver requests:  N/A    Communicated to , Clinical Manager, or Director regarding patient/family/caregiver/aide findings:  N/A    Status of patient upon end of hospice aide visit:  Patient laying in her bed talking with her grandson.

## 2022-10-24 ENCOUNTER — HOME CARE VISIT (OUTPATIENT)
Dept: SCHEDULING | Facility: HOME HEALTH | Age: 87
End: 2022-10-24
Payer: MEDICARE

## 2022-10-24 PROCEDURE — G0299 HHS/HOSPICE OF RN EA 15 MIN: HCPCS

## 2022-10-24 PROCEDURE — G0156 HHCP-SVS OF AIDE,EA 15 MIN: HCPCS

## 2022-10-24 PROCEDURE — 0651 HSPC ROUTINE HOME CARE

## 2022-10-25 VITALS
DIASTOLIC BLOOD PRESSURE: 80 MMHG | SYSTOLIC BLOOD PRESSURE: 107 MMHG | TEMPERATURE: 98.3 F | HEART RATE: 88 BPM | OXYGEN SATURATION: 95 %

## 2022-10-25 PROCEDURE — 0651 HSPC ROUTINE HOME CARE

## 2022-10-25 NOTE — HOSPICE
Patient    Last few days coughing up phlegm. Sometimes she can get it out other times she cannot    Not really taking any pain medication - last dose was the other day \"it felt so full it was uncomforable\"    Doesn't walk  \"I don't patient says. Sits up when she eats. Stool softener? Has senna    Some nights Trazadone does not work - either can't stay asleep or can't fall asleep. Intermittent sleeping issues. Can we do 1.5 trazadone.   Elise Sofi states that when she wakes us with 2, patient is like a zombie    lorazepa, 0.5mg every 6 hours as needed    senna plus 2tabs bid prn constipation

## 2022-10-26 PROCEDURE — 0651 HSPC ROUTINE HOME CARE

## 2022-10-26 NOTE — HOSPICE
The following standard precautions for infection control were utilized: Face Mask, Safety Glasses and Gloves. Care provided per established plan of care:  Yes. Patient is without complaints during care provided. Patient requests: N/A    Family/Caregiver requests:  N/A    Communicated to , Clinical Manager, or Director regarding patient/family/caregiver/aide findings:  N/A    Status of patient upon end of hospice aide visit:  Patient is sitting up in her bed watching TV.

## 2022-10-27 ENCOUNTER — HOME CARE VISIT (OUTPATIENT)
Dept: SCHEDULING | Facility: HOME HEALTH | Age: 87
End: 2022-10-27
Payer: MEDICARE

## 2022-10-27 VITALS
RESPIRATION RATE: 18 BRPM | DIASTOLIC BLOOD PRESSURE: 80 MMHG | HEART RATE: 96 BPM | OXYGEN SATURATION: 94 % | SYSTOLIC BLOOD PRESSURE: 134 MMHG | TEMPERATURE: 98 F

## 2022-10-27 PROCEDURE — G0156 HHCP-SVS OF AIDE,EA 15 MIN: HCPCS

## 2022-10-27 PROCEDURE — 0651 HSPC ROUTINE HOME CARE

## 2022-10-27 PROCEDURE — G0300 HHS/HOSPICE OF LPN EA 15 MIN: HCPCS

## 2022-10-27 NOTE — HOSPICE
Patient presents in bed awake and pleasant today. No signs of discomfort. Vitals within normal limits. No new concerns per grandson. Medication refills: Decadron     Medications reconciled and all medications are available in the home this visit. The following education was provided regarding medications, medication interactions, and look a like medications: Medication side effects, dosages, purposes, frequencies. Terence Robison Response to teaching: verbalized understanding. Medications  are effective at this time. Supplies by type and quantity ordered/delivered this visit include: nothing    Consulted attending physician regarding: Not needed    Instructed patient and family on 24-hour hospice availability and phone number.

## 2022-10-28 PROCEDURE — 0651 HSPC ROUTINE HOME CARE

## 2022-10-28 PROCEDURE — HOSPICE MEDICATION HC HH HOSPICE MEDICATION

## 2022-10-29 PROCEDURE — 0651 HSPC ROUTINE HOME CARE

## 2022-10-30 PROCEDURE — 0651 HSPC ROUTINE HOME CARE

## 2022-10-31 ENCOUNTER — HOME CARE VISIT (OUTPATIENT)
Dept: SCHEDULING | Facility: HOME HEALTH | Age: 87
End: 2022-10-31
Payer: MEDICARE

## 2022-10-31 VITALS
DIASTOLIC BLOOD PRESSURE: 77 MMHG | HEART RATE: 117 BPM | OXYGEN SATURATION: 93 % | RESPIRATION RATE: 18 BRPM | TEMPERATURE: 98 F | SYSTOLIC BLOOD PRESSURE: 106 MMHG

## 2022-10-31 PROCEDURE — G0156 HHCP-SVS OF AIDE,EA 15 MIN: HCPCS

## 2022-10-31 PROCEDURE — 0651 HSPC ROUTINE HOME CARE

## 2022-10-31 PROCEDURE — HOSPICE MEDICATION HC HH HOSPICE MEDICATION

## 2022-10-31 PROCEDURE — G0300 HHS/HOSPICE OF LPN EA 15 MIN: HCPCS

## 2022-10-31 NOTE — HOSPICE
Patient presents sitting on the edge of her bed eating breakfast.  No complaints of pain this visit. Patient complained of a productive cough with thick mucus. Order obtained for Mucinex. No other new questions or concerns this visit. Pain:Patient denied pain    Medication refills: Zofran, Mucinex    Medications reconciled and all medications are available in the home this visit. The following education was provided regarding medications, medication interactions, and look a like medications: Medication side effects, dosages, purposes, frequencies. Response to teaching: Verbalized understanding. Medications  are effective at this time. Supplies by type and quantity ordered/delivered this visit include: Med briefs, chux, wipes    Consulted attending physician regarding: Mucinex order obtained from [place] patient and family on 24-hour hospice availability and phone number.

## 2022-10-31 NOTE — HOSPICE
The following standard precautions for infection control were utilized:  Mask  Patient was getting her bath and Ori Bowie chatted with the [address] He is doing well concerned about patient becuase she seems down from time to time.  will follow up with patient and family.

## 2022-11-01 PROCEDURE — 0651 HSPC ROUTINE HOME CARE

## 2022-11-02 ENCOUNTER — HOME CARE VISIT (OUTPATIENT)
Dept: HOSPICE | Facility: HOSPICE | Age: 87
End: 2022-11-02
Payer: MEDICARE

## 2022-11-02 PROCEDURE — 0651 HSPC ROUTINE HOME CARE

## 2022-11-03 ENCOUNTER — HOME CARE VISIT (OUTPATIENT)
Dept: HOSPICE | Facility: HOSPICE | Age: 87
End: 2022-11-03
Payer: MEDICARE

## 2022-11-03 ENCOUNTER — HOME CARE VISIT (OUTPATIENT)
Dept: SCHEDULING | Facility: HOME HEALTH | Age: 87
End: 2022-11-03
Payer: MEDICARE

## 2022-11-03 PROCEDURE — G0156 HHCP-SVS OF AIDE,EA 15 MIN: HCPCS

## 2022-11-03 PROCEDURE — 0651 HSPC ROUTINE HOME CARE

## 2022-11-04 ENCOUNTER — HOME CARE VISIT (OUTPATIENT)
Dept: SCHEDULING | Facility: HOME HEALTH | Age: 87
End: 2022-11-04
Payer: MEDICARE

## 2022-11-04 PROCEDURE — 0651 HSPC ROUTINE HOME CARE

## 2022-11-04 PROCEDURE — HOSPICE MEDICATION HC HH HOSPICE MEDICATION

## 2022-11-04 PROCEDURE — G0300 HHS/HOSPICE OF LPN EA 15 MIN: HCPCS

## 2022-11-04 NOTE — HOSPICE
The following standard precautions for infection control were utilized: Face Mask, Safety Glasses and Gloves. Care provided per established plan of care:  Yes. Patient is without complaints during care provided. Patient requests: N/A    Family/Caregiver requests:  N/A    Communicated to , Clinical Manager, or Director regarding patient/family/caregiver/aide findings:  N/A    Status of patient upon end of hospice aide visit:  Patient laying in her hospital bed talking to her grandson.

## 2022-11-05 PROCEDURE — 0651 HSPC ROUTINE HOME CARE

## 2022-11-06 VITALS
HEART RATE: 99 BPM | OXYGEN SATURATION: 92 % | RESPIRATION RATE: 18 BRPM | TEMPERATURE: 98 F | SYSTOLIC BLOOD PRESSURE: 124 MMHG | DIASTOLIC BLOOD PRESSURE: 80 MMHG

## 2022-11-06 PROCEDURE — 0651 HSPC ROUTINE HOME CARE

## 2022-11-06 NOTE — HOSPICE
Patient presents in bed watching television. No signs of acute distress noted. Vitals within normal limits. No new concerns or needs this visit. Medication refills: Sleeping pill    Medications reconciled and all medications are available in the home this visit. The following education was provided regarding medications, medication interactions, and look a like medications: Medication side effects, dosages, purposes, frequencies. Gabbi Callaway Response to teaching: Verbalized understanding. Medications  are effective at this time. Supplies by type and quantity ordered/delivered this visit include: Chux, briefs, wipes, optifoam    Consulted attending physician regarding: Not needed this visit    Instructed patient and family on 24-hour hospice availability and phone number.

## 2022-11-07 ENCOUNTER — HOME CARE VISIT (OUTPATIENT)
Dept: SCHEDULING | Facility: HOME HEALTH | Age: 87
End: 2022-11-07
Payer: MEDICARE

## 2022-11-07 ENCOUNTER — HOME CARE VISIT (OUTPATIENT)
Dept: HOSPICE | Facility: HOSPICE | Age: 87
End: 2022-11-07
Payer: MEDICARE

## 2022-11-07 PROCEDURE — G0299 HHS/HOSPICE OF RN EA 15 MIN: HCPCS

## 2022-11-07 PROCEDURE — HOSPICE MEDICATION HC HH HOSPICE MEDICATION

## 2022-11-07 PROCEDURE — G0156 HHCP-SVS OF AIDE,EA 15 MIN: HCPCS

## 2022-11-07 PROCEDURE — 0651 HSPC ROUTINE HOME CARE

## 2022-11-08 ENCOUNTER — HOME CARE VISIT (OUTPATIENT)
Dept: HOSPICE | Facility: HOSPICE | Age: 87
End: 2022-11-08
Payer: MEDICARE

## 2022-11-08 VITALS
DIASTOLIC BLOOD PRESSURE: 76 MMHG | TEMPERATURE: 98.6 F | SYSTOLIC BLOOD PRESSURE: 130 MMHG | OXYGEN SATURATION: 90 % | HEART RATE: 91 BPM | RESPIRATION RATE: 15 BRPM

## 2022-11-08 PROCEDURE — 0651 HSPC ROUTINE HOME CARE

## 2022-11-08 NOTE — HOSPICE
Medication refills ordered this visit: mucinex, senna, decadron, pepcid    Medications reconciled and all medications are available in the home this visit. Education was provided regarding medications, medication interactions, and look alike medications. Response to teaching: verbalized understanding. Medications are effective at this time. Supplies by type and quantity ordered this visit include: wipes    Consulted medical director/attending physician regarding: none    Instructed patient/family/caregiver on 24-hour hospice availability and phone number.

## 2022-11-09 PROCEDURE — 0651 HSPC ROUTINE HOME CARE

## 2022-11-10 ENCOUNTER — HOME CARE VISIT (OUTPATIENT)
Dept: SCHEDULING | Facility: HOME HEALTH | Age: 87
End: 2022-11-10
Payer: MEDICARE

## 2022-11-10 VITALS
DIASTOLIC BLOOD PRESSURE: 65 MMHG | HEART RATE: 103 BPM | TEMPERATURE: 97.7 F | OXYGEN SATURATION: 96 % | SYSTOLIC BLOOD PRESSURE: 95 MMHG

## 2022-11-10 PROCEDURE — G0300 HHS/HOSPICE OF LPN EA 15 MIN: HCPCS

## 2022-11-10 PROCEDURE — G0156 HHCP-SVS OF AIDE,EA 15 MIN: HCPCS

## 2022-11-10 NOTE — HOSPICE
Patient presents sitting on the side of her bed. No signs of distress noted. Vitals within normal limits. Complaining of post nasal drip. Order aquired from [address] for Flonase    Medication refills: none    Medications reconciled and all medications are available in the home this visit. The following education was provided regarding medications, medication interactions, and look a like medications: Medication side effects, dosages, purposes, frequencies. Response to teaching: Verbalized understanding. Medications  are effective at this time. Supplies by type and quantity ordered/delivered this visit include: Wipes left in the home    Consulted attending physician regarding: Flonase    Instructed patient and family on 24-hour hospice availability and phone number.

## 2022-11-14 ENCOUNTER — HOME CARE VISIT (OUTPATIENT)
Dept: SCHEDULING | Facility: HOME HEALTH | Age: 87
End: 2022-11-14
Payer: MEDICARE

## 2022-11-14 VITALS
SYSTOLIC BLOOD PRESSURE: 111 MMHG | TEMPERATURE: 97.9 F | DIASTOLIC BLOOD PRESSURE: 77 MMHG | HEART RATE: 92 BPM | OXYGEN SATURATION: 92 % | RESPIRATION RATE: 18 BRPM

## 2022-11-14 PROCEDURE — G0300 HHS/HOSPICE OF LPN EA 15 MIN: HCPCS

## 2022-11-14 PROCEDURE — G0156 HHCP-SVS OF AIDE,EA 15 MIN: HCPCS

## 2022-11-14 NOTE — HOSPICE
Patient presents in bed receiving her bath. No complaints of pain No signs of acute distress noted. Vitals within normal limits. Patient has not had a cough in the morning for the last three days. Pain:No pain this visit    Medication refills:None needed,  Flonase delivered today    Medications reconciled and all medications are available in the home this visit. The following education was provided regarding medications, medication interactions, and look a like medications: Medication side effects, dosages, purposes, frequencies. Response to teaching: Verbalized understanding. Medications  are effective at this time. Supplies by type and quantity ordered/delivered this visit include: Gloves, wipes, body wash, barrier cream    Consulted attending physician regarding: None needed    Instructed patient and family on 24-hour hospice availability and phone number.

## 2022-11-15 ENCOUNTER — HOME CARE VISIT (OUTPATIENT)
Dept: HOSPICE | Facility: HOSPICE | Age: 87
End: 2022-11-15
Payer: MEDICARE

## 2022-11-15 NOTE — HOSPICE
The following standard precautions for infection control were utilized: Face Mask, Safety Glasses and Gloves. Care provided per established plan of care:  Yes. Patient is without complaints during care provided. Patient requests: N/A    Family/Caregiver requests:  N/A    Communicated to , Clinical Manager, or Director regarding patient/family/caregiver/aide findings:  N/A    Status of patient upon end of hospice aide visit:  Patient is sitting up in her bed awaiting the arrival of two of her friends who is coming over to  visit.

## 2022-11-16 ENCOUNTER — HOME CARE VISIT (OUTPATIENT)
Dept: HOSPICE | Facility: HOSPICE | Age: 87
End: 2022-11-16
Payer: MEDICARE

## 2022-11-16 ENCOUNTER — HOME CARE VISIT (OUTPATIENT)
Dept: SCHEDULING | Facility: HOME HEALTH | Age: 87
End: 2022-11-16
Payer: MEDICARE

## 2022-11-16 PROCEDURE — G0155 HHCP-SVS OF CSW,EA 15 MIN: HCPCS

## 2022-11-16 NOTE — HOSPICE
The following standard precautions for infection control were utilized:  hand . The purpose of today's visit was to complete a monthly SW visit. SW was greeted at the front door by [place], grandson and CG. CG welcomed me into the home and stated that VC from the team was also present. The home is clean and clutter free and there are no visibly safety concerns noted by SW at this time. CG stated that everything seems to be going well with patient. CG did state that about 2 weeks ago patient had about a 3 day long depressive episode. He stated she didn't want to get out of bed, was uninterested in food, and overall just down. He stated that she snapped out of it as quickly as she went into it and she has been in a fair mood \"back to herself\" since. No other needs expressed at this time by CG. Pt was received in her bedroom, sitting up in bed, speaking to the VC. The VC was present to provide CG respite and was also going to be taught a card game by pt. CG transferred pt to wheelchair and moved her to the kitchen table. SW sat and spoke with pt/VC as pt did some life review. Active listening was provided by SW as she spoke of her childhood, early adult years. Pt has a great memory but sometimes has a hard time getting words together sometimes. Pt recognizes this cognitive decline. Pt is in a joyous mood today and seems very happy to have visits from VC and SW. She denies pain, but states that sometimes her brain feels like it's going to pop out of her head. She questions SW about the pace of her tumors growth. SW stated that is unknown, but she appears to doing well now. No other needs discussed at this time.

## 2022-11-17 ENCOUNTER — HOME CARE VISIT (OUTPATIENT)
Dept: SCHEDULING | Facility: HOME HEALTH | Age: 87
End: 2022-11-17
Payer: MEDICARE

## 2022-11-17 VITALS
HEART RATE: 101 BPM | OXYGEN SATURATION: 92 % | SYSTOLIC BLOOD PRESSURE: 132 MMHG | RESPIRATION RATE: 18 BRPM | DIASTOLIC BLOOD PRESSURE: 70 MMHG | TEMPERATURE: 97.9 F

## 2022-11-17 PROCEDURE — G0300 HHS/HOSPICE OF LPN EA 15 MIN: HCPCS

## 2022-11-17 PROCEDURE — G0156 HHCP-SVS OF AIDE,EA 15 MIN: HCPCS

## 2022-11-18 NOTE — HOSPICE
Patient presents sitting up in bed. Complaints of increased pressure in her head but had just taken her pain pill. Cough has resolved. No new concerns at this time. Medication refills: None needed this visit    Medications reconciled and all medications are available in the home this visit. The following education was provided regarding medications, medication interactions, and look a like medications: Response to teaching: Verbalized understanding. Medications  are effective at this time. Supplies by type and quantity ordered/delivered this visit include: Briefs     Consulted attending physician regarding: Not needed    Instructed patient and family on 24-hour hospice availability and phone number.

## 2022-11-20 NOTE — HOSPICE
The following standard precautions for infection control were utilized: Face Mask, Safety Glasses and Gloves. Care provided per established plan of care:  Yes. Patient is without complaints during care provided. Patient requests: N/A    Family/Caregiver requests:  N/A    Communicated to , Clinical Manager, or Director regarding patient/family/caregiver/aide findings:  N/A    Status of patient upon end of hospice aide visit:  Patient is sitting up in her bed talking with her Grandson.

## 2022-11-21 ENCOUNTER — HOME CARE VISIT (OUTPATIENT)
Dept: SCHEDULING | Facility: HOME HEALTH | Age: 87
End: 2022-11-21
Payer: MEDICARE

## 2022-11-21 PROCEDURE — G0156 HHCP-SVS OF AIDE,EA 15 MIN: HCPCS

## 2022-11-22 ENCOUNTER — HOME CARE VISIT (OUTPATIENT)
Dept: HOSPICE | Facility: HOSPICE | Age: 87
End: 2022-11-22
Payer: MEDICARE

## 2022-11-22 ENCOUNTER — HOME CARE VISIT (OUTPATIENT)
Dept: SCHEDULING | Facility: HOME HEALTH | Age: 87
End: 2022-11-22
Payer: MEDICARE

## 2022-11-22 PROCEDURE — G0299 HHS/HOSPICE OF RN EA 15 MIN: HCPCS

## 2022-11-23 VITALS
HEART RATE: 100 BPM | RESPIRATION RATE: 17 BRPM | OXYGEN SATURATION: 96 % | DIASTOLIC BLOOD PRESSURE: 67 MMHG | SYSTOLIC BLOOD PRESSURE: 130 MMHG | TEMPERATURE: 99.2 F

## 2022-11-23 NOTE — HOSPICE
Medication refills ordered this visit: mucinex, dexamethasone    Medications reconciled and all medications are available in the home this visit. Education was provided regarding medications, medication interactions, and look alike medications. Response to teaching: verbalized understanding. Medications are effective at this time. Supplies by type and quantity ordered this visit include: none    Consulted medical director/attending physician regarding: none    Instructed patient/family/caregiver on 24-hour hospice availability and phone number.

## 2022-11-24 ENCOUNTER — HOME CARE VISIT (OUTPATIENT)
Dept: SCHEDULING | Facility: HOME HEALTH | Age: 87
End: 2022-11-24
Payer: MEDICARE

## 2022-11-24 PROCEDURE — G0156 HHCP-SVS OF AIDE,EA 15 MIN: HCPCS

## 2022-11-25 ENCOUNTER — HOME CARE VISIT (OUTPATIENT)
Dept: SCHEDULING | Facility: HOME HEALTH | Age: 87
End: 2022-11-25
Payer: MEDICARE

## 2022-11-25 PROCEDURE — G0299 HHS/HOSPICE OF RN EA 15 MIN: HCPCS

## 2022-11-25 NOTE — HOSPICE
The following standard precautions for infection control were utilized: Face Mask, Safety Glasses and Gloves. Care provided per established plan of care:  Yes. Patient is without complaints during care provided. Patient requests: N/A    Family/Caregiver requests:  N/A    Communicated to , Clinical Manager, or Director regarding patient/family/caregiver/aide findings:  N/A    Status of patient upon end of hospice aide visit:  Patient sitting up in her hospital bed about to watch TV.

## 2022-11-27 VITALS
DIASTOLIC BLOOD PRESSURE: 62 MMHG | SYSTOLIC BLOOD PRESSURE: 127 MMHG | OXYGEN SATURATION: 93 % | RESPIRATION RATE: 17 BRPM | HEART RATE: 106 BPM

## 2022-11-28 ENCOUNTER — HOME CARE VISIT (OUTPATIENT)
Dept: SCHEDULING | Facility: HOME HEALTH | Age: 87
End: 2022-11-28
Payer: MEDICARE

## 2022-11-28 VITALS
SYSTOLIC BLOOD PRESSURE: 141 MMHG | RESPIRATION RATE: 17 BRPM | TEMPERATURE: 98.3 F | HEART RATE: 103 BPM | DIASTOLIC BLOOD PRESSURE: 75 MMHG | OXYGEN SATURATION: 95 %

## 2022-11-28 PROCEDURE — G0156 HHCP-SVS OF AIDE,EA 15 MIN: HCPCS

## 2022-11-28 PROCEDURE — G0299 HHS/HOSPICE OF RN EA 15 MIN: HCPCS

## 2022-11-28 NOTE — HOSPICE
per patient her HR is slowly going up  has reached 114 at one point. She states she is comfortable if she doesnt feel comfortable she will take it earlier than normal.      Celine mucinex and flonase. Tessalon pearls?               Supplies: thinks they were ordered, check on    meds: refill needed for mucinex

## 2022-11-28 NOTE — HOSPICE
Hospice Re-Certification Summary:   Patient name: Thais Novoa  : 10/19/1931  Hospice Benefit Period: entering 2nd  Face-to-Face visit required (if going into 3rd benefit period or greater): no  Medications reconciled this visit: none  Hospice supplies delivered this visit: none  Hospice Diagnosis/Secondary/Related Diagnoses: intracranial mass   Co-morbid Diagnoses: HTN, Intracranial hemmorhage, falls    History of the illness (es): 1 year prior to hospice admission patient was living with her grandson, ambulatory with a cane, still dependent for ADL's. Patient then had two falls, was evaluated in the ED after hitting her head and was found to have an intracranial mass. Since admission, patient spends most time in her bed, occasionally getting into a wheelchair with maximum assistance from her grandson for transfers. Pt states she is unable to use her muscles in her legs to help her stand because she experiences tremors and jerking and her legs completely \"give out\". Patient continues to get in the wheelchair seldom and she reports feeling completely exhausted afterwards and sleeps the rest of the next and the next because of fatigue. Patient is dependent for all ADL's except feeding herself. Patient is alert, very [place], has appropriate conversation with some forgetfulness occasionally. Patient struggles with aphasia and some stuttering of words. Patient eats a pureed and/or mechanical soft diet due to some difficulty swallowing. Patient has developed a productive cough with thick yellow sputum, c/o postnasal drip initally managed with mucinex but has now worsened. Started patient on azithromycin and tessalon perles for suspected URI. Patient is incontinent of bowel and bladder, experienced constipation during benefit period which is now relieved using senna daily and dulcolax suppositories as needed. Patient had a very small stage 2 to sacrum, resolved.   Eligibility Assessment q Prognosis Guideline (LCD) Patient is eligible for hospice care as evidenced by ([place]): worsening expressive aphasia, increasing weakness and fatigue with activity, dysphagia  Code Status - DNR    Assessment Tools   PPS: 30%  Malnutrition: 2 meals per day, mechanical soft or pureed food - occasional nausea causing decrease in appetite   Weakness: generalized weakness - patient occasionally gets out of bed and into wheelchair with maximum assist from grandson  Functional status: dependent for all ADL's besides feeding   Cognitive function: a&ox4  Skin integrity: clean, dry and intact  Recent infections: treating suspected URI with azithromycin  Increased need for services: headache management with decadron, anbx for suspected URI  Decreased pulmonary function: shortness of breath, productive cough with yellow sputum - oxygen saturations drop to 86-90% when speaking  Decrease cardiac function: fatigues easily  Patient eligible for hospice re-certification to be discussed with Dr. Mariann Cottrell during [facility] on: 11/30/22

## 2022-11-30 ENCOUNTER — HOME CARE VISIT (OUTPATIENT)
Dept: HOSPICE | Facility: HOSPICE | Age: 87
End: 2022-11-30
Payer: MEDICARE

## 2022-12-01 ENCOUNTER — HOME CARE VISIT (OUTPATIENT)
Dept: SCHEDULING | Facility: HOME HEALTH | Age: 87
End: 2022-12-01
Payer: MEDICARE

## 2022-12-01 ENCOUNTER — HOME CARE VISIT (OUTPATIENT)
Dept: HOSPICE | Facility: HOSPICE | Age: 87
End: 2022-12-01
Payer: MEDICARE

## 2022-12-01 PROCEDURE — G0299 HHS/HOSPICE OF RN EA 15 MIN: HCPCS

## 2022-12-02 VITALS
RESPIRATION RATE: 17 BRPM | OXYGEN SATURATION: 97 % | SYSTOLIC BLOOD PRESSURE: 143 MMHG | TEMPERATURE: 98.3 F | HEART RATE: 96 BPM | DIASTOLIC BLOOD PRESSURE: 88 MMHG

## 2022-12-02 NOTE — HOSPICE
Medication refills ordered this visit: duo-neb    Medications reconciled and all medications are available in the home this visit. Education was provided regarding medications, medication interactions, and look alike medications. Response to teaching: verbalized understanding. Medications are effective at this time. Supplies by type and quantity ordered this visit include: nebulizer, oxygen concentrator    Consulted medical director/attending physician regarding: none    Instructed patient/family/caregiver on 24-hour hospice availability and phone number.

## 2022-12-05 ENCOUNTER — HOME CARE VISIT (OUTPATIENT)
Dept: SCHEDULING | Facility: HOME HEALTH | Age: 87
End: 2022-12-05
Payer: MEDICARE

## 2022-12-05 PROCEDURE — G0156 HHCP-SVS OF AIDE,EA 15 MIN: HCPCS

## 2022-12-05 PROCEDURE — G0299 HHS/HOSPICE OF RN EA 15 MIN: HCPCS

## 2022-12-06 ENCOUNTER — HOME CARE VISIT (OUTPATIENT)
Dept: HOSPICE | Facility: HOSPICE | Age: 87
End: 2022-12-06
Payer: MEDICARE

## 2022-12-06 VITALS
DIASTOLIC BLOOD PRESSURE: 81 MMHG | TEMPERATURE: 97.5 F | HEART RATE: 103 BPM | SYSTOLIC BLOOD PRESSURE: 135 MMHG | OXYGEN SATURATION: 93 %

## 2022-12-06 NOTE — HOSPICE
The following standard precautions for infection control were utilized: Face Mask, Safety Glasses and Gloves. Care provided per established plan of care:  Yes. Patient is without complaints during care provided. Patient requests: N/A    Family/Caregiver requests:  N/A    Communicated to , Clinical Manager, or Director regarding patient/family/caregiver/aide findings:  N/A    Status of patient upon end of hospice aide visit:  Patient sitting up in her hospital bed talking with her [place].
WDL

## 2022-12-06 NOTE — HOSPICE
Patient awake and alert sitting up in bed. Patient puts in her false teeth as this nurse walks int the door. takes dexamethasone once a day but sometimes twice. Writer suggested that patient take the medication BID as ordered    Was opaque then yellow. Was just yellow prior. Rangel Simmons finished still has yellow sputum. Patient states that she is not coughing as hard or as much. Can feel it coming down from a hole in the back of my meck and not coming up from my gut. Writer attempted to explain to patient that it is not coming from her neck but her sinuses. SHe states that she has had it for 6 weeks and usually stuff like this will only take 2 weeks. Patient states that the nebulizer and the Rangel Latus are making it better. patient discusses the tightness and borderline pain that goes across her forehead and sometimes down the midline of her left side. She states that yesterday morning she had fuzzy vision in her left eye that she describes as looking through wax paper. She states that it cleared up later on that day. Patient still has thick yellowish mucous that she is couging up, she showed it to writer int he tissue. Lost a steroid pill that patient keeps at her bedside. She states that she has not needed the second steriod yet. Per patient, she is eating \"pretty good\" She states she is \"eating quite a bit\" but she is starting to get a sensitive stomach when they turn her. Ryan Egan states that they started her back on her anti-nausea pills because she hasn't been nauseated. He states she did not utilize them for about a month. When asked, he states that they have enough pills they have almost a box. Writer informed him to let this nurse know if he needs more. Breakfast is her best meal will eat dinner and asks for a dessert. She states she eats soft stuff. Patient states that she is having diffuculty with aphasia.   She can think of what she wants to say \"but I can't make it reach my tongue\" or \"i go to say it and it flies out of my brain. \"  She stats that sometimes she will remember what she wasnted to say later on. Or she can hear what is being said, but whe is unsure what is being said. For instance, she states that sometimes. you will say rain and hear train. She states she is good with seeing things like cards. Patient states that she is prisoner in the bed and her grandson is prisoner to the house because of her. She states that she is worried about what will happen and how will he go see him. Patient very chatty, talks about a lot of subjects. Patient grandson denies any supply or medication needs.

## 2022-12-08 ENCOUNTER — HOME CARE VISIT (OUTPATIENT)
Dept: SCHEDULING | Facility: HOME HEALTH | Age: 87
End: 2022-12-08
Payer: MEDICARE

## 2022-12-08 PROCEDURE — G0156 HHCP-SVS OF AIDE,EA 15 MIN: HCPCS

## 2022-12-08 NOTE — HOSPICE
The following standard precautions for infection control were utilized:  Mask  Patient was up and we talked about her family history and her grandson share about his growing up.  will follow up with patient and family.

## 2022-12-09 ENCOUNTER — HOME CARE VISIT (OUTPATIENT)
Dept: SCHEDULING | Facility: HOME HEALTH | Age: 87
End: 2022-12-09
Payer: MEDICARE

## 2022-12-09 PROCEDURE — G0299 HHS/HOSPICE OF RN EA 15 MIN: HCPCS

## 2022-12-12 ENCOUNTER — HOME CARE VISIT (OUTPATIENT)
Dept: SCHEDULING | Facility: HOME HEALTH | Age: 87
End: 2022-12-12
Payer: MEDICARE

## 2022-12-12 PROCEDURE — G0156 HHCP-SVS OF AIDE,EA 15 MIN: HCPCS

## 2022-12-12 PROCEDURE — G0300 HHS/HOSPICE OF LPN EA 15 MIN: HCPCS

## 2022-12-13 ENCOUNTER — HOME CARE VISIT (OUTPATIENT)
Dept: SCHEDULING | Facility: HOME HEALTH | Age: 87
End: 2022-12-13
Payer: MEDICARE

## 2022-12-13 ENCOUNTER — HOME CARE VISIT (OUTPATIENT)
Dept: HOSPICE | Facility: HOSPICE | Age: 87
End: 2022-12-13
Payer: MEDICARE

## 2022-12-13 VITALS
DIASTOLIC BLOOD PRESSURE: 78 MMHG | SYSTOLIC BLOOD PRESSURE: 134 MMHG | HEART RATE: 74 BPM | RESPIRATION RATE: 18 BRPM | TEMPERATURE: 98 F | OXYGEN SATURATION: 95 %

## 2022-12-13 PROCEDURE — G0155 HHCP-SVS OF CSW,EA 15 MIN: HCPCS

## 2022-12-13 NOTE — HOSPICE
2964:  felt extremely weak, head hurt, couldnt move a muscle. gave her steroid and watched her. then let her sleep. Patient arouses easily to verbal stimuli. Speech a little delay. Woke up this morning never felt this way before. I toook a pain pill (dexamehtasone) first time the pain didnt fully go away. Patient asked this nurse if she could take another. Writer explained that she could take one in the morning and one in the evening. She states that I cant take one 2 hours later. Writer explained to her that her order is for once in the morning and once in the evening. That the steroid is not a pain pill. She states the pain was worse than has been, just uncomfortable. It still hurts. Patient rates pain 3/10 \"I don't know, I don't usually gauge that. \"  She described it as \"in her face, when she woke up it was her whole body. \"  She said it was like I ate ground glass. Then after a while of taking the pain pill, a while alter the body pain went away but it is still in her face. Patient describes the pain as pressure. She states that she was just so weak. Writer suggested to family to take the dexamethasone BID as prescribed. Suggested ibuprophen for pain as needed. Demarco informed this nurse that they are not using the nebulizer as much as patient is not coughing as much.       Medication needs:  Tessalon  Dexamethasone    Supplies: XL gloves

## 2022-12-13 NOTE — HOSPICE
Patient presents in bed watching TV. Vitals within normal limits. NO new concerns this visit. Pain:No pain    Medication refills: None needed    Medications reconciled and all medications are available in the home this visit. The following education was provided regarding medications, medication interactions, and look a like medications: Medication side effects, dosages, purposes, frequencies. Response to teaching: Verbalized understanding. Medications  are effective at this time. Supplies by type and quantity ordered/delivered this visit include: None needed    Consulted attending physician regarding: Not needed this visit    Instructed patient and family on 24-hour hospice availability and phone number.

## 2022-12-14 NOTE — HOSPICE
The following standard precautions for infection control were utilized:  hand /hand washing. The purpose of today's SW visit was to complete a monthly visit to assess for needs and provide emotional support to cg/pt. SW was received at the front door of the home by [place], CG/grandson of pt. The home is a single level structure and appears well cared for. Home is clean and clutter-free and there are no visible safety concerns noted by SW during this visit. CG was given all paperwork for VA Benefits, with fax confirmation of transmission to fax number provided by him. CG stated there were no new needs at this time and he hopes that the [place] will be able to provide the pt with some additional cg benefits. CG is only person caring for pt at this time and he would benefit from additional paid cg's. Volunteer services are being utilized 1 x week by [facility]. CG uses this time to go grocery shopping and run errands. CG states pt continues to have breakthrough pain with headaches and yesterday she required additional medication for the pain. CG states pt's sleep is minimal during night time hours. Pt was brought into the living area of the home by CG in a wheelchair. She presents in clean pajamas and there are no noted signs of neglect/abuse. Pt is in a joyful mood and is excited when she is visited by [facility] and Lyndell Goodpasture is providing services 1 x week and pt has taught VC how to play a favorite card game, which they play at every visit. Companionship is provided during visit. Pt often does life review and today she spoke about the difficult relationship she has with her daughter. SW offers active listening, emotional support, and processing throughout visit as she discusses the relationship. Continued support will be needed as this causes pt emotional stress when speaking about this. Pt/CG have a playful, and caring relationship as witnessed through their interactions.   Pt is grateful for his care and wishes he had more support from the family. No other needs voiced at this time.

## 2022-12-15 ENCOUNTER — HOME CARE VISIT (OUTPATIENT)
Dept: SCHEDULING | Facility: HOME HEALTH | Age: 87
End: 2022-12-15
Payer: MEDICARE

## 2022-12-15 VITALS
HEART RATE: 84 BPM | SYSTOLIC BLOOD PRESSURE: 140 MMHG | RESPIRATION RATE: 16 BRPM | TEMPERATURE: 98.4 F | DIASTOLIC BLOOD PRESSURE: 74 MMHG | OXYGEN SATURATION: 95 %

## 2022-12-15 PROCEDURE — G0299 HHS/HOSPICE OF RN EA 15 MIN: HCPCS

## 2022-12-15 PROCEDURE — G0156 HHCP-SVS OF AIDE,EA 15 MIN: HCPCS

## 2022-12-16 NOTE — HOSPICE
Taking steroid BID - no longer st strong. Taking ibuprophen when she does have pain. Starting to choke a little bit. Did before abut cough cough hard. Swallowing is different. Going down the wriong way more often          Mucinex, melatonin, famotidine  refill needed.   [facility] confirmation number [id number]    Supplies:  Chux, diapers medium, Order Number : [id number]  Check on gloves XL

## 2022-12-19 ENCOUNTER — HOME CARE VISIT (OUTPATIENT)
Dept: SCHEDULING | Facility: HOME HEALTH | Age: 87
End: 2022-12-19
Payer: MEDICARE

## 2022-12-19 PROCEDURE — G0156 HHCP-SVS OF AIDE,EA 15 MIN: HCPCS

## 2022-12-19 PROCEDURE — G0299 HHS/HOSPICE OF RN EA 15 MIN: HCPCS

## 2022-12-20 NOTE — HOSPICE
QThe following standard precautions for infection control were utilized: Face Mask, Safety Glasses and Gloves. Care provided per established plan of care:  Yes. Patient is without complaints during care provided. Patient requests: N/A    Family/Caregiver requests:  N/A    Communicated to , Clinical Manager, or Director regarding patient/family/caregiver/aide findings:  N/A  Status of patient upon end of hospice aide visit:  Patient sitting up in her hospital bed watching TV. Ned Kawasaki is at bedside.

## 2022-12-21 VITALS
OXYGEN SATURATION: 96 % | TEMPERATURE: 98.3 F | HEART RATE: 102 BPM | DIASTOLIC BLOOD PRESSURE: 80 MMHG | SYSTOLIC BLOOD PRESSURE: 132 MMHG

## 2022-12-21 NOTE — HOSPICE
Patient sitting on side of bed, dusting the television cabinet at foot of bed. Patient states she is doing the same when asked how she is doing. Irritating, pressure - doesn't call it pain yet. Oliver just left a little bit ago. SHe got me per patient. Per grandson, he found her \"pain pill in her diaper last night. \"      That's what under the bed is for - to hide things per patient. Per patient, she wanted milk and oreos for lunch. Meds needed:  Zofran  Confirmation #: [id number]    Manam Howard states that other than that he does not need anything      Supplies left bedside:  XL gloves.   Gloves

## 2022-12-22 ENCOUNTER — HOME CARE VISIT (OUTPATIENT)
Dept: SCHEDULING | Facility: HOME HEALTH | Age: 87
End: 2022-12-22
Payer: MEDICARE

## 2022-12-22 ENCOUNTER — HOME CARE VISIT (OUTPATIENT)
Dept: HOSPICE | Facility: HOSPICE | Age: 87
End: 2022-12-22
Payer: MEDICARE

## 2022-12-22 VITALS
DIASTOLIC BLOOD PRESSURE: 83 MMHG | TEMPERATURE: 98.4 F | SYSTOLIC BLOOD PRESSURE: 158 MMHG | OXYGEN SATURATION: 99 % | RESPIRATION RATE: 18 BRPM | HEART RATE: 78 BPM

## 2022-12-22 PROCEDURE — G0156 HHCP-SVS OF AIDE,EA 15 MIN: HCPCS

## 2022-12-22 PROCEDURE — G0300 HHS/HOSPICE OF LPN EA 15 MIN: HCPCS

## 2022-12-22 NOTE — HOSPICE
Patient presents sitting up in her bed. No signs of distress or discomfort. Patient has no pain at this time but has headaches off and on. No new complaints except itching and dry skin. Patient has Hydroguard silicone cream in the home and is advised to apply to arms when she is itching. Pain:No pain    Medication refills: None needed    Medications reconciled and all medications are available in the home this visit. The following education was provided regarding medications, medication interactions, and look a like medications: Medication side effects, dosages, purposes, frequencies. Response to teaching: Verbalized understanding. Medications  are effective at this time. Supplies by type and quantity ordered/delivered this visit include: Medium briefs, XL gloves, wipes, chux  Order Number : [id number]    Consulted attending physician regarding: Not needed    Instructed patient and family on 24-hour hospice availability and phone number.

## 2022-12-27 ENCOUNTER — HOME CARE VISIT (OUTPATIENT)
Dept: SCHEDULING | Facility: HOME HEALTH | Age: 87
End: 2022-12-27
Payer: MEDICARE

## 2022-12-27 ENCOUNTER — HOME CARE VISIT (OUTPATIENT)
Dept: HOSPICE | Facility: HOSPICE | Age: 87
End: 2022-12-27
Payer: MEDICARE

## 2022-12-27 VITALS
TEMPERATURE: 98.2 F | DIASTOLIC BLOOD PRESSURE: 79 MMHG | HEART RATE: 89 BPM | RESPIRATION RATE: 17 BRPM | OXYGEN SATURATION: 96 % | SYSTOLIC BLOOD PRESSURE: 158 MMHG

## 2022-12-27 PROCEDURE — G0156 HHCP-SVS OF AIDE,EA 15 MIN: HCPCS

## 2022-12-27 PROCEDURE — G0299 HHS/HOSPICE OF RN EA 15 MIN: HCPCS

## 2022-12-29 ENCOUNTER — HOME CARE VISIT (OUTPATIENT)
Dept: SCHEDULING | Facility: HOME HEALTH | Age: 87
End: 2022-12-29
Payer: MEDICARE

## 2022-12-29 PROCEDURE — G0156 HHCP-SVS OF AIDE,EA 15 MIN: HCPCS

## 2022-12-29 PROCEDURE — G0299 HHS/HOSPICE OF RN EA 15 MIN: HCPCS

## 2022-12-29 NOTE — HOSPICE
The following standard precautions for infection control were utilized: Face Mask, Safety Glasses and Gloves. Care provided per established plan of care:  Yes. Patient is without complaints during care provided. Patient requests: N/A    Family/Caregiver requests:  N/A    Communicated to , Clinical Manager, or Director regarding patient/family/caregiver/aide findings:  Esau Vega RN Case Manager. Status of patient upon end of hospice aide visit:  Patient sitting up in her hospital bed talking to her grandson.

## 2022-12-31 NOTE — HOSPICE
The following standard precautions for infection control were utilized: Face Mask, Safety Glasses and Gloves. Care provided per established plan of care:  Yes. Patient is without complaints during care provided. Patient requests: N/A    Family/Caregiver requests:  N/A    Communicated to , Clinical Manager, or Director regarding patient/family/caregiver/aide findings:  N/A    Status of patient upon end of hospice aide visit:  Patient sitting up in her hospital bed talking to her [place].

## 2023-01-01 ENCOUNTER — HOME CARE VISIT (OUTPATIENT)
Age: 88
End: 2023-01-01
Payer: MEDICARE

## 2023-01-01 VITALS
TEMPERATURE: 98.1 F | HEART RATE: 113 BPM | OXYGEN SATURATION: 90 % | DIASTOLIC BLOOD PRESSURE: 62 MMHG | SYSTOLIC BLOOD PRESSURE: 138 MMHG | RESPIRATION RATE: 25 BRPM

## 2023-01-01 PROCEDURE — G0156 HHCP-SVS OF AIDE,EA 15 MIN: HCPCS

## 2023-01-01 PROCEDURE — G0299 HHS/HOSPICE OF RN EA 15 MIN: HCPCS

## 2023-01-01 RX ADMIN — MORPHINE SULFATE 5 MG: 100 SOLUTION ORAL at 14:54

## 2023-01-01 ASSESSMENT — ENCOUNTER SYMPTOMS
CONTUSION: 1
BOWEL INCONTINENCE: 1
TROUBLE SWALLOWING: 1

## 2023-01-01 NOTE — HOSPICE
Patient awake and alert in bed. Complains of having more pressue today. Juan Cooper states that she was very active yesterday and busy. He states that she has not been talking much  Patient has increased word findng today and is stumbling over words. I am so fortunate that I don't have pain. I have pressure, but I am still fortunate. I am having trouble putting words together and forming sentences. She states what I am trying to say is it could be words. Oh well Good morning    I didn't sleep good and then slept a little bit. Juan Cooper states that she had sme pressure right about the time that her morning dexamethasone was due. Patient states that when she arises in the morning, her left eye is fuzzy and her right eye isnot. She states she has to get her marbles together to make sense.

## 2023-01-02 ENCOUNTER — HOME CARE VISIT (OUTPATIENT)
Dept: SCHEDULING | Facility: HOME HEALTH | Age: 88
End: 2023-01-02
Payer: MEDICARE

## 2023-01-02 PROCEDURE — G0156 HHCP-SVS OF AIDE,EA 15 MIN: HCPCS

## 2023-01-03 ENCOUNTER — HOME CARE VISIT (OUTPATIENT)
Dept: SCHEDULING | Facility: HOME HEALTH | Age: 88
End: 2023-01-03
Payer: MEDICARE

## 2023-01-03 ENCOUNTER — HOME CARE VISIT (OUTPATIENT)
Dept: HOSPICE | Facility: HOSPICE | Age: 88
End: 2023-01-03
Payer: MEDICARE

## 2023-01-03 VITALS
DIASTOLIC BLOOD PRESSURE: 73 MMHG | OXYGEN SATURATION: 96 % | HEART RATE: 83 BPM | SYSTOLIC BLOOD PRESSURE: 134 MMHG | TEMPERATURE: 98.4 F | RESPIRATION RATE: 18 BRPM

## 2023-01-03 PROCEDURE — G0300 HHS/HOSPICE OF LPN EA 15 MIN: HCPCS

## 2023-01-03 NOTE — HOSPICE
The following standard precautions for infection control were utilized: Face Mask, Safety Glasses and Gloves. Care provided per established plan of care: Yes    Patient is without complaints during care provided. Patient requests: N/A    Family/Caregiver requests:  N/A    Communicated to , Clinical Manager, or Director regarding patient/family/caregiver/aide findings:  N/A    Status of patient upon end of hospice aide visit:  Patient sitting up in her hospital bed talking to her grandson [place].

## 2023-01-04 ENCOUNTER — HOME CARE VISIT (OUTPATIENT)
Dept: SCHEDULING | Facility: HOME HEALTH | Age: 88
End: 2023-01-04
Payer: MEDICARE

## 2023-01-04 PROCEDURE — G0156 HHCP-SVS OF AIDE,EA 15 MIN: HCPCS

## 2023-01-04 NOTE — HOSPICE
Patient presents sitting in her kitchen. No signs of acute distress noted. No new concerns per caregiver. No new questions at this time. Pain:Denies pain at this time    Medication refills: None needed    Medications reconciled and all medications are available in the home this visit. The following education was provided regarding medications, medication interactions, and look a like medications: Medication side effects, dosages, purposes, frequencies. Response to teaching: Verbalized understanding. . Medications  are effective at this time. Supplies by type and quantity ordered/delivered this visit include: Not needed this visit    Consulted attending physician regarding: Not needed this visit    Instructed patient and family on 24-hour hospice availability and phone number.

## 2023-01-05 ENCOUNTER — HOME CARE VISIT (OUTPATIENT)
Dept: SCHEDULING | Facility: HOME HEALTH | Age: 88
End: 2023-01-05
Payer: MEDICARE

## 2023-01-05 VITALS
OXYGEN SATURATION: 99 % | HEART RATE: 83 BPM | RESPIRATION RATE: 15 BRPM | SYSTOLIC BLOOD PRESSURE: 141 MMHG | TEMPERATURE: 98.4 F | DIASTOLIC BLOOD PRESSURE: 98 MMHG

## 2023-01-05 PROCEDURE — G0299 HHS/HOSPICE OF RN EA 15 MIN: HCPCS

## 2023-01-05 NOTE — HOSPICE
The following standard precautions for infection control were utilized: Face Mask, Safety Glasses and Gloves. Care provided per established plan of care:  Yes. Patient is with complaints during care provided. Patient requests: N/A    Family/Caregiver requests:  N/A    Communicated to , Clinical Manager, or Director regarding patient/family/caregiver/aide findings:  Communicated with [facility] Nurse. Status of patient upon end of hospice aide visit:  Patient sitting up in her hospital bed talking with her [place].

## 2023-01-06 NOTE — HOSPICE
Patient awake and alert sitting up on the side of the bed with feet dangling. Patient denies any pain and discomfort. Patient states, \"My vision is getting worse and worse and worse. My left eye is getting so foggy. Is there anything we can do about it? 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 Patient asks if this nurse can tell if it is growing or getting smaller. Grandson informs her that it is probably not getting smaller. Writer informed patient that this nurse could not tell the size of the tumor as writer does not have the necessary diagnositic equipment to tell her. Patient also asked this nurse if [place] knew how much longer it would be. Writer and patient had conversation that no one can tell her how much longer she would be with us. Patient chatty, talking to this nurse about instances with the eye doctor. Patient informs this nurse that she had a cataract removed when she was younger. Patient states that she is eating more than she ever has. She states that she is eating cheetos and coffee for breakfast, then will eat something for lunch at about 1130 and will have \"a balanced meal\" for dinner and is now eating desserts which she has never cared for before. LBM was today,  Patient states that sometimes she has 2 BM's in a day. She states that grandson has skipped the laxative when she has 2 in a day. She states that if she skips too much, her stool gets too hard. Patient asked if she could cut the senna in half. Writer educated patient and grandson about how to tell if you could cut medication in half. SUggested if the patient has 2 in a day and it bothers her, they could take the medication every other day. Patient and grandson inform this nurse that patient has intermittent itching in the mornings. Patient states that it does not last that long and it is not daily.   Sammie Middleton states that the patient is will put the silicone cream on it and will have him put lotion on her back. Supplies:  chux, diapers, wipes, barrier cream and silicone cream.  Medline Order Number : [id number]    Medications ordered:  Melatonin ordered for home delivery.   [facility] confirmation number: [id number]

## 2023-01-09 ENCOUNTER — HOME CARE VISIT (OUTPATIENT)
Dept: SCHEDULING | Facility: HOME HEALTH | Age: 88
End: 2023-01-09
Payer: MEDICARE

## 2023-01-09 ENCOUNTER — HOME CARE VISIT (OUTPATIENT)
Dept: HOSPICE | Facility: HOSPICE | Age: 88
End: 2023-01-09
Payer: MEDICARE

## 2023-01-09 VITALS
TEMPERATURE: 97.4 F | DIASTOLIC BLOOD PRESSURE: 92 MMHG | SYSTOLIC BLOOD PRESSURE: 126 MMHG | RESPIRATION RATE: 18 BRPM | HEART RATE: 90 BPM | OXYGEN SATURATION: 97 %

## 2023-01-09 PROCEDURE — G0156 HHCP-SVS OF AIDE,EA 15 MIN: HCPCS

## 2023-01-09 PROCEDURE — G0299 HHS/HOSPICE OF RN EA 15 MIN: HCPCS

## 2023-01-09 NOTE — HOSPICE
Medication refills ordered this visit: none needed     Medications reconciled and all medications are available in the home this visit. The following education was provided regarding medications, medication interactions, and look alike medications: nolne needed. . Medications are effective effective at this time. Supplies by type and quantity ordered this visit include: none needed    Consulted medical director/attending physician regarding: none needed    Instructed patient/family/caregiver on 24-hour hospice availability and phone number. patient alert and oriented x 3 states had some visual hullucinations on saturday that lasted a few minutes .  no futher episodes since

## 2023-01-11 ENCOUNTER — HOME CARE VISIT (OUTPATIENT)
Dept: SCHEDULING | Facility: HOME HEALTH | Age: 88
End: 2023-01-11
Payer: MEDICARE

## 2023-01-11 ENCOUNTER — HOME CARE VISIT (OUTPATIENT)
Dept: HOSPICE | Facility: HOSPICE | Age: 88
End: 2023-01-11
Payer: MEDICARE

## 2023-01-11 PROCEDURE — G0155 HHCP-SVS OF CSW,EA 15 MIN: HCPCS

## 2023-01-12 ENCOUNTER — HOME CARE VISIT (OUTPATIENT)
Dept: SCHEDULING | Facility: HOME HEALTH | Age: 88
End: 2023-01-12
Payer: MEDICARE

## 2023-01-12 PROCEDURE — G0156 HHCP-SVS OF AIDE,EA 15 MIN: HCPCS

## 2023-01-12 PROCEDURE — G0299 HHS/HOSPICE OF RN EA 15 MIN: HCPCS

## 2023-01-13 NOTE — HOSPICE
Patient awake and alert sitting up eating brunch. Patient denies pain and discomfort. Patient drinking a cup of coffee as well. Patient informs this nurse that she is eating a hardboiled egg over a piece of bread. Patient very talkative, telling this nurse many different stories. Patient states that she wishes she could talk better. Writer asked her mean what she meant. She states she is having trouble finding her words. SHe also states that she is talking more jerky, that she feels that her speech is not flowing. yesterday she s    Patient states that she did not take her \"pain pill\" (dexamethasone) at 0700 like she normally does. She states she began to have a little bit of pain and took 3 200mg ibuprofens. She states that took her regular \"pain\" medication and she is feeling much better. patient states that she has forgotten it two times. Mili Vazquez states she has forgotten it more than taht. Patient states, Liliya Piper is nothing extraordinary going on. \"      Per grandson, patient is not sleeping well at all. Per patient yesterday she slept a little, yesterday and last night I slept quite a bit more than I normally do. But the night before that I did not sleep. She states when I don't sleep well, usually the next day I sleep all day and all night. Some days I don't sleep well at all and I watch programs on television. She also informs this nurse of when the programs on. All night it is cowboy shows until 0400 then it is Confucianist programs until 0800 per pateint. Demarco, when asked, states that they are utilizing the melatonin every night between 0914-7709. Mili Vazquez states that he has stopped giving her the Trazadone. Writer educated him that he could give both the Trazadone and melatonin together. Demarco states that at one point they tried two trazadone and she was like a zombie the next day.       Patient states that sometimes her face feels full or like pressure, especially on her left side. She states she is thankful that it isn't anything more than pressure. Patient consistently clearing throat. Patient more confused and forgetful. Patient reminded to take her medications several times while this nurse sitting at the bedside. Grandson denies any medication or supply needs.

## 2023-01-14 NOTE — HOSPICE
The following standard precautions for infection control were utilized: Face Mask, Safety Glasses and Gloves. Care provided per established plan of care:  Yes. Patient is without complaints during care provided. Patient requests: N/A    Family/Caregiver requests:  N/A    Communicated to , Clinical Manager, or Director regarding patient/family/caregiver/aide findings:  N/A    Status of patient upon end of hospice aide visit:  Patient laying in her hospital bed watching TV. Thalidomide Counseling: I discussed with the patient the risks of thalidomide including but not limited to birth defects, anxiety, weakness, chest pain, dizziness, cough and severe allergy.

## 2023-01-14 NOTE — HOSPICE
The following standard precautions for infection control were utilized:  hand sanitizing. The purpose of today's SW visit was to complete a monthly visit to assess for additional needs and provide emotional support to pt/cg. SW was received at the front door by [facility], who was also visiting pt and providing CG relief. The home is well cared for and there are no safety concerns noted by SW. Pt was received sitting up on the side of her bed in her room. Pt welcomed SW in and asked SW to have a seat. Pt was looking through documents to find and show SW and VC her Judo certificate and firearm training. Pt denies any pain at this time, states her appetite is really good and stated jokingly the cg is trying to \"get her fat. \"  Pt enjoys the company of VC and SW during visits and often does life review. Pt has an excellent memory and many stories she enjoys sharing. Pt believes CG is doing a great job with CG and denies any issues. Pt did report that she has started seeing bugs on the ceiling but understands they aren't really there. 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 Pt also reports seeing people in her room, especially a little girl with blue eyes, standing with her arms folded. Pt states she cannot make out details of her face, only the blue eyes. Pt is not fearful of seeing the little girl and knows she is probably there to prepare her for her transition to [facility]. CG arrived at  visit was ending. CG stated that [place] requests for Aids and Attendants had been received by [place] and is still under review. CG does have a need for additional support in the home due to being the only cg. CG uses volunteer services provided by hospice, but additional cg is hoped for through the [place] benefit. No other needs stated by CG/PT at this time.

## 2023-01-16 ENCOUNTER — HOSPICE ADMISSION (OUTPATIENT)
Age: 88
End: 2023-01-16
Payer: MEDICARE

## 2023-01-16 ENCOUNTER — HOME CARE VISIT (OUTPATIENT)
Dept: SCHEDULING | Facility: HOME HEALTH | Age: 88
End: 2023-01-16
Payer: MEDICARE

## 2023-01-16 VITALS
HEART RATE: 94 BPM | OXYGEN SATURATION: 96 % | RESPIRATION RATE: 15 BRPM | DIASTOLIC BLOOD PRESSURE: 93 MMHG | SYSTOLIC BLOOD PRESSURE: 156 MMHG

## 2023-01-16 PROCEDURE — G0156 HHCP-SVS OF AIDE,EA 15 MIN: HCPCS

## 2023-01-16 PROCEDURE — G0299 HHS/HOSPICE OF RN EA 15 MIN: HCPCS

## 2023-01-16 NOTE — HOSPICE
The following standard precautions for infection control were utilized:  Mask  Patient was in bed and we talked about life and she even said that she is ready whenever that day comes. Genoveva Toro will continue to follow up.

## 2023-01-16 NOTE — HOSPICE
I am okay just woke up. I was up earlier and went back to sleep. Patient speech a little slurred and she is word finding. She states, \"What I mean is I am okay, I just woke up earler and went back to sleep and just woke up again. \"  She states that those spots and stuff like that have gotten better. I have only saw the girl two times so far. I haven't seen her yesterday and the day before. When i first get up i don't make too much sense until I get my bearings. Patient brushing her hair while speaking. She states that she is about the same. She states that she took hermeds this morning, that he made sure she took it. Julian Cardenas makes me take it before I do anything. \"                  Medication refills needed: Dexamethasone

## 2023-01-17 ENCOUNTER — HOME CARE VISIT (OUTPATIENT)
Dept: HOSPICE | Facility: HOSPICE | Age: 88
End: 2023-01-17
Payer: MEDICARE

## 2023-01-19 ENCOUNTER — HOME CARE VISIT (OUTPATIENT)
Dept: SCHEDULING | Facility: HOME HEALTH | Age: 88
End: 2023-01-19
Payer: MEDICARE

## 2023-01-19 PROCEDURE — G0156 HHCP-SVS OF AIDE,EA 15 MIN: HCPCS

## 2023-01-19 PROCEDURE — G0299 HHS/HOSPICE OF RN EA 15 MIN: HCPCS

## 2023-01-20 NOTE — HOSPICE
Patient resting in bed with eyes open and no glasses on. Noted increased slurring of words. Grandsonstates that he thinks she is just having an off day. SHe is just tired. HE staes that sheis like that when it is her off day      Prescription still has not arrived yet, so grandlazarus gave her the last steroid this morning. Taking ibuprofen to assist with her pressure. Ate a full bowl of cereal this morning. Patient complains that she is having trouble swallowing. She states it almost feels like she has a sore throat but it is not dry. Patient states that she took one of her cough drops this morning. Its not bad, its oh I don't know; To me its like I am getting a sore throat. Patient could not hear the neighbor mowing the lawn intween the houses. \"I know that there are things that are different. \"  Aid Rossi tells grandson that \"You mean I won't make it to 100. \"  If I don't make it to 100 how will I harass you? Grandson tell her that Hospice will get tired of you. She sttes that is what she is afraid of that they will just kick me out. Writer asks patient how was her bath and patient states, \"It was a muscle. \"  She then explains to her grandson that it only hurts when she tries to sit up or asks for assistane to pull her up. Cyrus Porras asks her. She states \"It dont hurt now\"     Then she answers that she likes her bath. My bath was good. NOw if I lay here in the same position too long, then right in here (right leg attaches to body) hurts    Medications ordered for delivery.   Confirmation number [id number]

## 2023-01-23 ENCOUNTER — HOME CARE VISIT (OUTPATIENT)
Dept: SCHEDULING | Facility: HOME HEALTH | Age: 88
End: 2023-01-23
Payer: MEDICARE

## 2023-01-23 VITALS
HEART RATE: 95 BPM | TEMPERATURE: 97.9 F | SYSTOLIC BLOOD PRESSURE: 146 MMHG | OXYGEN SATURATION: 94 % | DIASTOLIC BLOOD PRESSURE: 93 MMHG

## 2023-01-23 PROCEDURE — G0299 HHS/HOSPICE OF RN EA 15 MIN: HCPCS

## 2023-01-23 PROCEDURE — G0156 HHCP-SVS OF AIDE,EA 15 MIN: HCPCS

## 2023-01-24 ENCOUNTER — HOME CARE VISIT (OUTPATIENT)
Dept: HOSPICE | Facility: HOSPICE | Age: 88
End: 2023-01-24
Payer: MEDICARE

## 2023-01-24 NOTE — HOSPICE
Patient awake and alert sitting up on the side of the bed leaning towards the right side. Patient denies pain and discomfort. She just states that her head felt hughes this morning, a little more pressure as she points to her head. I am going crazy. Patient states she is just sitting here doing nothing productive. Wehn asked she states that she thinks it is. I can't heare properly, I can't see properly and can't walk. She states that he said my feet are turning darker. Grandson takes sock of and states that \"they have lightened back up\"      Patient sounds wet today, consistently clearing throat. Patient state that she slept but she didn't sleep. He gave me two sleeping tablets. He gave me the steroids. I thought I ended up taking, we had a difference of opinion. 0700 you gave me the little one (the steroid). Patient speech kind of slurred, word finding. Lynnette Green states that he is taking pictures of medications to remind himself of when he gives them to patient as she forgets when he gives them. Supplies ordered through [employer] for delivery.

## 2023-01-26 ENCOUNTER — HOME CARE VISIT (OUTPATIENT)
Dept: SCHEDULING | Facility: HOME HEALTH | Age: 88
End: 2023-01-26
Payer: MEDICARE

## 2023-01-26 PROCEDURE — G0299 HHS/HOSPICE OF RN EA 15 MIN: HCPCS

## 2023-01-26 PROCEDURE — G0156 HHCP-SVS OF AIDE,EA 15 MIN: HCPCS

## 2023-01-26 NOTE — HOSPICE
Patient awake and alert sitting up in bed. Patient speaks to this nurse, her speech is slow and kind of slurring. Patient exhibits word finding and loses her train of thought while talking. Patient states the other day that she had an episode that sounds like shehad heart burn and tums made it worse. She states that since then she has been feeling washed out and tired. Patient states that the first taste of crab tasted funny but grandson states that it tasted okay. Patient states so i am not going to eat that again. Patient appears very drowsy and starts to fall asleep while talking.       Messy BM after [place] and she had one this morning per grandson                  Med needs: Pepcid  Mail order refill confirmation number [id number]    Supplies:  Diapers,

## 2023-01-28 NOTE — HOSPICE
The following standard precautions for infection control were utilized: Face Mask, Safety Glasses and Gloves. Care provided per established plan of care:  Yes. Patient is without complaints during care provided. Patient requests: N/A    Family/Caregiver requests:  N/A    Communicated to , Clinical Manager, or Director regarding patient/family/caregiver/aide findings:   present for part of visit. Status of patient upon end of hospice aide visit:  Patient laying in her hospital bed with her eyes open.

## 2023-01-30 ENCOUNTER — HOME CARE VISIT (OUTPATIENT)
Dept: SCHEDULING | Facility: HOME HEALTH | Age: 88
End: 2023-01-30
Payer: MEDICARE

## 2023-01-30 VITALS
SYSTOLIC BLOOD PRESSURE: 151 MMHG | OXYGEN SATURATION: 93 % | RESPIRATION RATE: 15 BRPM | HEART RATE: 105 BPM | TEMPERATURE: 97.9 F | DIASTOLIC BLOOD PRESSURE: 88 MMHG

## 2023-01-30 PROCEDURE — G0299 HHS/HOSPICE OF RN EA 15 MIN: HCPCS

## 2023-01-30 PROCEDURE — G0156 HHCP-SVS OF AIDE,EA 15 MIN: HCPCS

## 2023-01-31 ENCOUNTER — HOME CARE VISIT (OUTPATIENT)
Dept: HOSPICE | Facility: HOSPICE | Age: 88
End: 2023-01-31
Payer: MEDICARE

## 2023-01-31 NOTE — HOSPICE
Patient awake and alert laying supine in bed without glasses in place. Patient denies pain and discomfort. Behavior indicators negative as well. Patient states that it is pressure, not pain. She is very thankful that it isn't worse. She sttes just the pressure when she wakes up in the morning. She staes she is just not sure what is going on at night. One night she is dreaming and her bed is tore up and the next night she wakes up and the bed is perfect. She states that the dream are all about her family and the little girl that she saw two times that she really \"believes is  my daughter. \"  She states that the girl was wearing a blue dress and Juanita had blue eyes. She states that if she could see more, then she would know if that was her daughter in another world waiting for her. Patient states there are some nights that she is having more pain, but may be refusing to recognize it as pain. She states that there is confusing about a person in the grave or if the soul is traveling - like there are other worlds. Patient asks writer if she is losing weight. Writer informs her that her MAC is steady but her pace appears thinner as well as her temperal area. Patient has indentation on her hands where her metatarsals are. Patient informs this nurse that she \"can't eat any more. \"  Patient states she is losing weight, her teeth keeps falling out. She states that her teeth will fall down when she tries to take a mouthful of food. Her friend suggests that she just eat without them. Patient consistently clearing her throat during visit. Patient informs this nurse that she could not hold a safety pin in her hand and could not close it and dropped it on the floor. LBM:  This morning and 2 yesterday. Patient states that once they strted, \"they are getting revenge but they are what they should be. \"  Kennedy Avelar states that she looks in the commode everytime to make sure they are the consisitency she likes. Patient sttes that she tries to fix 7 bags on the same day and he eats them as they are ready. Writer asked patient if she was eating real food. She gave writer a side eyed look. She states that she had some mushroom soup and peas last night and shredded wheat this morning. I finally decided that it was a throat muscle and it was like a charley horse. Patient appears more forgetful this visit. She states that she gets frustrated that grandson snaps picturs every time he gives her a pill. He informs this nurse that he does that so that they do not argue about whether he gives it. She states that she just wants to pop him everytime he does it. Writer informs her that her grandson is just trying to help her remember. Patient also appears to get short of breath while speaking, patient denies. Ryan Egan states that he has not been out very much. Med Refills:  Dexamethasone.   Confirmation #[id number]    Supplies:  DIapers and stuff

## 2023-02-02 ENCOUNTER — HOME CARE VISIT (OUTPATIENT)
Age: 88
End: 2023-02-02
Payer: MEDICARE

## 2023-02-02 PROCEDURE — G0299 HHS/HOSPICE OF RN EA 15 MIN: HCPCS

## 2023-02-02 PROCEDURE — G0156 HHCP-SVS OF AIDE,EA 15 MIN: HCPCS

## 2023-02-03 ASSESSMENT — ENCOUNTER SYMPTOMS
COUGH: 1
COUGH CHARACTERISTICS: WEAK
TROUBLE SWALLOWING: 1

## 2023-02-03 NOTE — HOSPICE
Patient awake and alert sitting on the edge of the bed with bedside table in front of her.  Patient leaning to right side on her raised bed.  Patient denies pain and discomfort.  Patient behavior indicators negative as well.        Patient informs this nurse that she had some visitors prior to this nurse's visit.  She states she had 3 visitors, 1 from [place] and 2 from [place]    Patient has a weak non congested cough.  Patient coughs intermittently throughout visit.  Patient ltells this nurse how her table is shrinking, how she keeps adding stuff to it.  Patient has to consitently clear her throat.      She again tells this nurse about the warmers on her bedside table.  Today she states that she ordered it from [employer], where last time it was from [employer].  Patient informs this nurse all about the warm and what it heats up to.  And what temperature she can drink things out.      Alum.Ha it on bedside table. Patient informs this nurse how her mother did some emmanuel and she would put it in there to preserve it.  SHe also states that if she got a sore in her mouth as a child her mom would put alum on it and it would heal.  She states that she has been getting sores from her teeth and she is using it for that.            Supplies needed:  chux, medium diapers, XL gloves.  Ordered through [employer], order confirmation number [id number].  Medication needs: mucinex, melatonin, tessalon pearls.  Confirmation number:  [id number]

## 2023-02-06 ENCOUNTER — HOME CARE VISIT (OUTPATIENT)
Age: 88
End: 2023-02-06
Payer: MEDICARE

## 2023-02-06 VITALS
DIASTOLIC BLOOD PRESSURE: 74 MMHG | TEMPERATURE: 98.2 F | HEART RATE: 123 BPM | RESPIRATION RATE: 15 BRPM | SYSTOLIC BLOOD PRESSURE: 101 MMHG | OXYGEN SATURATION: 93 %

## 2023-02-06 PROCEDURE — G0299 HHS/HOSPICE OF RN EA 15 MIN: HCPCS

## 2023-02-06 PROCEDURE — G0156 HHCP-SVS OF AIDE,EA 15 MIN: HCPCS

## 2023-02-06 ASSESSMENT — ENCOUNTER SYMPTOMS
TROUBLE SWALLOWING: 1
BOWEL INCONTINENCE: 1
COUGH CHARACTERISTICS: NON-PRODUCTIVE
COUGH: 1

## 2023-02-06 NOTE — HOSPICE
Per patient, she is not doing well. She states that she does not have any energy. Jay Escalona states that it has been like that the past fews days. He states that it is taking her about 3 hours to eat because she keeps falling asleep. He states that she was just falling asleep while eating breakfast.      Sleeping \"quite a bit\" per mert. He describes it as half the day vice a little bit and trying to do things. Patient eats her cereal slowly, Cheerios soaked in milk. Patient appears to be having difficulty holding spoon and slurps the food off the spoon. Educated grandson on having patient take smaller bites. Jay Escalona states that patient had refried beeans, peas and fish for dinner, but that patient only ate the fish - which he was happy about. LBM was yesterday. Jay Escalona states that her feet started turning colors again so he put her slipper sock on her and that will change the color back to normal.      Jay Escalona states that she has been moving slower since about Saturday. He states that she has been moving slower and more tired. Mert denies any S/s of a UTI, \"nothing out of the ordinary. \"      Friction rub heard throughout right lung and upper left lobe. Edcuated grandson on aspiration precautions and different food types to prevent aspiration. Writer suggested to no longer use straws. Writer verified that patient had thickener in the home and that it was not . Writer informed grandroberta that should he need more, to let Hospice know. Patient asked this nurse if this would be her new normal or would she be like this forever. Writer informed her that this nurse could not tell her as writer could not see inside her head. Patient informs this nurse she just wants to sleep. She states that she has been like this since she had a dream about \"[place]. \"        Med refills ordered, confirmation #[id number]

## 2023-02-08 ENCOUNTER — HOME CARE VISIT (OUTPATIENT)
Age: 88
End: 2023-02-08
Payer: MEDICARE

## 2023-02-08 NOTE — HOSPICE
The following standard precautions for infection control were utilized: Face Mask, Gloves. Care provided per established plan of care:  Yes. Patient is without complaints during care provided. Patient requests: N/A    Family/Caregiver requests:  N/A    Communicated to , Clinical Manager, or Director regarding patient/family/caregiver/aide findings:  Grant Pacheco RN Case Manager present. Status of patient upon end of hospice aide visit:  Patient is asleep positioned on her left side.

## 2023-02-09 ENCOUNTER — HOME CARE VISIT (OUTPATIENT)
Age: 88
End: 2023-02-09
Payer: MEDICARE

## 2023-02-09 PROCEDURE — G0156 HHCP-SVS OF AIDE,EA 15 MIN: HCPCS

## 2023-02-09 PROCEDURE — G0299 HHS/HOSPICE OF RN EA 15 MIN: HCPCS

## 2023-02-10 ASSESSMENT — ENCOUNTER SYMPTOMS
COUGH CHARACTERISTICS: NON-PRODUCTIVE
TROUBLE SWALLOWING: 1
COUGH: 1
BOWEL INCONTINENCE: 1

## 2023-02-11 NOTE — HOSPICE
Patient resting in bed leaning to the right side partially off the bed. Per grandson, the HHA and Keshav just left. BM at 0200 this morning. Patient very chatty this visit. Speech slurred most of the time and patient has word finding. Patient confused at times. Vidya Liner states that he thinks his grandmother is doing better and she is staying awake more now. No supplies needed nor medications.

## 2023-02-11 NOTE — HOSPICE
The following standard precautions for infection control were utilized: Face Mask and Gloves. Care provided per established plan of care:  Yes. Patient is without complaints during care provided. Patient requests: N/A    Family/Caregiver requests:  N/A  Communicated to , Clinical Manager, or Director regarding patient/family/caregiver/aide findings:  N/A    Status of patient upon end of hospice aide visit:  Patient sitting up in her hospital bed with her grandson at her bedside.

## 2023-02-13 ENCOUNTER — HOME CARE VISIT (OUTPATIENT)
Age: 88
End: 2023-02-13
Payer: MEDICARE

## 2023-02-13 VITALS
SYSTOLIC BLOOD PRESSURE: 145 MMHG | HEART RATE: 94 BPM | DIASTOLIC BLOOD PRESSURE: 72 MMHG | OXYGEN SATURATION: 99 % | TEMPERATURE: 98.2 F | RESPIRATION RATE: 17 BRPM

## 2023-02-13 PROCEDURE — G0299 HHS/HOSPICE OF RN EA 15 MIN: HCPCS

## 2023-02-13 PROCEDURE — G0156 HHCP-SVS OF AIDE,EA 15 MIN: HCPCS

## 2023-02-13 ASSESSMENT — ENCOUNTER SYMPTOMS
TROUBLE SWALLOWING: 1
SKIN LESIONS: 1
COUGH CHARACTERISTICS: NON-PRODUCTIVE
CONTUSION: 1
COUGH: 1
BOWEL INCONTINENCE: 1

## 2023-02-14 ENCOUNTER — HOME CARE VISIT (OUTPATIENT)
Age: 88
End: 2023-02-14
Payer: MEDICARE

## 2023-02-14 NOTE — HOSPICE
The following standard precautions for infection control were utilized:  Mask  Patient was in the livig talking to her friends [facility]. They were having a good visit.  will continue to follow up with patient and family.

## 2023-02-14 NOTE — HOSPICE
The following standard precautions for infection control were utilized: Face Mask and Gloves. Care provided per established plan of care: Yes. Patient is without complaints during care provided. Patient requests: N/A    Family/Caregiver requests:  N/A    Communicated to , Clinical Manager, or Director regarding patient/family/caregiver/aide findings:  Communicated with Ananda Oconnor RN Case Manager. Status of patient upon end of hospice aide visit:  Patient laying in her hospital bed with her Grandson at her bedside.

## 2023-02-15 ENCOUNTER — HOME CARE VISIT (OUTPATIENT)
Age: 88
End: 2023-02-15
Payer: MEDICARE

## 2023-02-16 ENCOUNTER — HOME CARE VISIT (OUTPATIENT)
Age: 88
End: 2023-02-16
Payer: MEDICARE

## 2023-02-16 PROCEDURE — G0156 HHCP-SVS OF AIDE,EA 15 MIN: HCPCS

## 2023-02-17 ENCOUNTER — HOME CARE VISIT (OUTPATIENT)
Age: 88
End: 2023-02-17
Payer: MEDICARE

## 2023-02-17 PROCEDURE — G0299 HHS/HOSPICE OF RN EA 15 MIN: HCPCS

## 2023-02-20 ENCOUNTER — HOME CARE VISIT (OUTPATIENT)
Age: 88
End: 2023-02-20
Payer: MEDICARE

## 2023-02-20 PROCEDURE — G0156 HHCP-SVS OF AIDE,EA 15 MIN: HCPCS

## 2023-02-20 PROCEDURE — G0299 HHS/HOSPICE OF RN EA 15 MIN: HCPCS

## 2023-02-20 ASSESSMENT — ENCOUNTER SYMPTOMS
CONTUSION: 1
STOOL DESCRIPTION: BALL-SHAPED
SORE THROAT: 1

## 2023-02-20 NOTE — HOSPICE
Patient, had no complaints at this time. Caregiver, states that there are enough medications. S yanick refills at this time.

## 2023-02-21 ENCOUNTER — HOME CARE VISIT (OUTPATIENT)
Age: 88
End: 2023-02-21
Payer: MEDICARE

## 2023-02-21 PROCEDURE — G0155 HHCP-SVS OF CSW,EA 15 MIN: HCPCS

## 2023-02-21 ASSESSMENT — ENCOUNTER SYMPTOMS: STOOL DESCRIPTION: LOOSE

## 2023-02-21 NOTE — HOSPICE
Patient Lkes to sit with her legs, off the side of her bed. Patient has mottling in lower extremities when sitting in this position. when laying Supine patients skin looks much better No complaints at this time. No reordering of meds.

## 2023-02-22 ENCOUNTER — HOME CARE VISIT (OUTPATIENT)
Age: 88
End: 2023-02-22
Payer: MEDICARE

## 2023-02-22 NOTE — HOSPICE
The following standard precautions for infection control were utilized:  hand . The purpose of today's SW visit was to complete a monthly assessment of pt/cg needs and to provide emotional support. Patient was received in her bedroom, in bed, with the head of the bed upright. Pt welcomed SW into the room. Pt had a hard time seeing SW and didn't realize who SW until introduced self. Pt's appearance since last month has changed drastically. She is unable to get up any longer without complete help, she is visibly losing weight, and skin color is dull. Pt's speech abilities have also declined and she speaks softer and is forgetting more words. Pt realizes this and gets upset with herself when she cannot think of the correct word. Pt still enjoys doing life review when she has company. Pt denies pain, but states she only hurts when she has to move. CG and SW discussed the use of pain medication and PT does not want to use morphine at this time. SW educated PT/CG on use morphine for pain and asked CG to discuss this further when RN CM visits. CG is also showing signs of extreme CG fatigue. SW ask if CG has heard back from [employer] about application for [employer] and he stated he has not heard from them. SW will follow [facility] on [facility]. CG states pt is more agitated each day, mostly due to her memory loss. He states that she will ask him to do things, like rearrange her bedside table, and then fuss with him that the table tray is different from yesterday. SW to request additional volunteer time in the home for CG respite and to discuss a respite stay for pt to give CG some relief for 3-5 days. No other needs discussed at this time.

## 2023-02-23 ENCOUNTER — HOME CARE VISIT (OUTPATIENT)
Age: 88
End: 2023-02-23
Payer: MEDICARE

## 2023-02-23 VITALS
HEART RATE: 92 BPM | DIASTOLIC BLOOD PRESSURE: 90 MMHG | OXYGEN SATURATION: 90 % | SYSTOLIC BLOOD PRESSURE: 156 MMHG | RESPIRATION RATE: 18 BRPM

## 2023-02-23 PROCEDURE — G0299 HHS/HOSPICE OF RN EA 15 MIN: HCPCS

## 2023-02-23 PROCEDURE — G0156 HHCP-SVS OF AIDE,EA 15 MIN: HCPCS

## 2023-02-23 RX ADMIN — Medication 10 MG: at 10:35

## 2023-02-23 ASSESSMENT — ENCOUNTER SYMPTOMS
CONTUSION: 1
CONSTIPATION: 1
PAIN LOCATION - PAIN QUALITY: UNABLE TO ASSESS

## 2023-02-23 NOTE — HOSPICE
Medication refills ordered this visit: N/a    Medications reconciled and all medications are/are not available in the home this visit. The following education was provided regarding medications, medication interactions, and look alike medications: Reviewed Lorazepam usage, . Response to teaching: Caregiver verbalized understanding. . Medications are effective at this time. Supplies by type and quantity ordered this visit include: N/a    Consulted medical director/attending physician regarding: N/a    Instructed patient/family/caregiver on 24-hour hospice availability and phone number. Plan for next visit:  Provide Memory foam cushion, for sitting to help alleviate back pain, while sitting.

## 2023-02-24 NOTE — HOSPICE
The following standard precautions for infection control were utilized: Face Mask, Safety Glasses and Gloves. Care provided per established plan of care:  Yes. Patient is without complaints during care provided. Patient requests: N/A    Family/Caregiver requests:  N/A    Communicated to , Clinical Manager, or Director regarding patient/family/caregiver/aide findings:  Communicated with Bert Thompson RN Case Manager. Status of patient upon end of hospice aide visit:  Patient laying in her hospital bed with her eyes closed.

## 2023-02-26 NOTE — HOSPICE
The following standard precautions for infection control were utilized: Face Mask and Gloves. Care provided per established plan of care:  Yes. Patient is  without complaints during care provided. Patient requests: N/A    Family/Caregiver requests:  N/A    Communicated to , Clinical Manager, or Director regarding patient/family/caregiver/aide findings:  N/A    Status of patient upon end of hospice aide visit:  Patient laying in her bed with her eyes closed. Patient appears to be comfortable.

## 2023-02-28 NOTE — HOSPICE
Patient resting in bed leaning to the right side. No complaints of pain or shortness of breath. No facial grimacing. She did remember that she did not get coffee at 0700. Breathing - have heard the weirdness in her breathing. Sometimes will hear a whistle or a wheeze intermittently. Repositioning helps. Per grandson she has been unresponsive over the weekend - read the blue book and read the Handbook that was given to you. Had to be feed one day, then another she missed breakfast/pills because she wouldn't open her mouth. Her verbal ability is almost completely gone per grandson. After her bath she rouse and was able to eat and feed herself, was able to speak. After that, she fell aleep. Took 20 minutes to get out one sentence    Writer gave and educated patient grandson how to administer. Patient educated why medication was to be given however patient only mouthed with no words coming out.     Medications ordred:  Liquid Lorazepam.  Onofre confirmation number [id number]

## 2023-03-01 NOTE — HOSPICE
The following standard precautions for infection control were utilized:  Mask  Upon arrival as we enter into her room we recognize that patient is no longer breathing.  sat with Christal Ladn and he shared stories about her. Christal Land was grieving appropriately upon leaving.

## 2023-03-01 NOTE — HOSPICE
Pronouncement of death completed by: ALEX Alvarenga, RN. Agency staff () was present at the time of death, . At the time of death the patient was documented as [facility] death pronouncement: apneic, pulseless, blood pressure absent and unresponsive to verbal, tactile, and or painful stimuli. Patient pronounced 1138. The patient  within her home. The following were notified of the patient's death: grandson at bedside. Medications were disposed of per [facility] protocol. Morphine 16mL  Lorazepam 30mL  Lorazepam 0.5mg - 24 tablets  Trazadone 50mg - 28 tablets  Dexamethasone 2mg - 13 tablets    Postmortem care provided to patient. Family/Caregiver did not actively and willingly participated in care. Patient and families postmortem Restorationist and cultural wishes maintained. The following patient belongings one jose and gold ring and two david rings were given to grandson, all belongs left in the home. The following were removed from patient body: NONE. Eyes gently closed. HOB raised to 30 degrees. Encouraged family/caregiver to spend time with  patient. Provided emotional support. This clinician, LORA Santamaria and  did stay with family/caregiver until  home arrived. Family/Caregiver is appropriately/inappropriately coping/grieving currently. Extra support requested for patient due to inappropriate coping/grieving. Michael Barrera 12[address] home picked up remains.     Equipment pickup scheduled for 3 March 2023 between 3018-0105 per grandson request.  Mark Dumont confirmation numbers: [id number] though [id number]

## 2023-03-02 NOTE — HOSPICE
The following standard precautions for infection control were utilized: Face Mask and Gloves. Care provided per established plan of care:  Yes. Patient is without complaints during care provided. Patient requests:N/A    Family/Caregiver requests:  N/A    Communicated to , Clinical Manager, or Director regarding patient/family/caregiver/aide findings:  Communicated with Darrion Taylor RN Case Manager. Status of patient upon end of hospice aide visit:  Patient laying in her bed with her head elevated and her Jessica Hones at her bedside.